# Patient Record
Sex: FEMALE | Race: BLACK OR AFRICAN AMERICAN | NOT HISPANIC OR LATINO | URBAN - METROPOLITAN AREA
[De-identification: names, ages, dates, MRNs, and addresses within clinical notes are randomized per-mention and may not be internally consistent; named-entity substitution may affect disease eponyms.]

---

## 2023-08-01 LAB
CLUE CELLS: NORMAL
NUGENT SCORE: 0
URINE CULTURE: NO GROWTH
YEAST: NORMAL

## 2023-08-02 LAB
CHLAMYDIA TRACH., AMPLIFIED: NEGATIVE
N. GONORRHEA, AMPLIFIED: NEGATIVE
TRICHOMONAS VAGINALIS: NEGATIVE

## 2023-08-09 ENCOUNTER — APPOINTMENT (OUTPATIENT)
Dept: LAB | Facility: LAB | Age: 28
End: 2023-08-09
Payer: MEDICAID

## 2023-08-09 LAB
ABO GROUP (TYPE) IN BLOOD: NORMAL
ANTIBODY SCREEN: NORMAL
ERYTHROCYTE DISTRIBUTION WIDTH (RATIO) BY AUTOMATED COUNT: 16.3 % (ref 11.5–14.5)
ERYTHROCYTE MEAN CORPUSCULAR HEMOGLOBIN CONCENTRATION (G/DL) BY AUTOMATED: 33.2 G/DL (ref 32–36)
ERYTHROCYTE MEAN CORPUSCULAR VOLUME (FL) BY AUTOMATED COUNT: 84 FL (ref 80–100)
ERYTHROCYTES (10*6/UL) IN BLOOD BY AUTOMATED COUNT: 4.12 X10E12/L (ref 4–5.2)
HEMATOCRIT (%) IN BLOOD BY AUTOMATED COUNT: 34.6 % (ref 36–46)
HEMOGLOBIN (G/DL) IN BLOOD: 11.5 G/DL (ref 12–16)
HEPATITIS B VIRUS SURFACE AG PRESENCE IN SERUM: NONREACTIVE
HEPATITIS C VIRUS AB PRESENCE IN SERUM: NONREACTIVE
HIV 1/ 2 AG/AB SCREEN: NONREACTIVE
LEUKOCYTES (10*3/UL) IN BLOOD BY AUTOMATED COUNT: 6.8 X10E9/L (ref 4.4–11.3)
NRBC (PER 100 WBCS) BY AUTOMATED COUNT: 0 /100 WBC (ref 0–0)
PLATELETS (10*3/UL) IN BLOOD AUTOMATED COUNT: 231 X10E9/L (ref 150–450)
REFLEX ADDED, ANEMIA PANEL: ABNORMAL
RH FACTOR: NORMAL
RUBELLA VIRUS IGG AB: POSITIVE
SYPHILIS TOTAL AB: NONREACTIVE

## 2023-08-15 LAB
HEMOGLOBIN A2: 3.3 %
HEMOGLOBIN A: 56.5 %
HEMOGLOBIN F: 2.1 %
HEMOGLOBIN IDENTIFICATION INTERPRETATION: NORMAL
HEMOGLOBIN S: 38.1 %
PATH REVIEW-HGB IDENTIFICATION: NORMAL

## 2023-09-07 LAB
MISCELLANEUOUS TEST RESULT: NORMAL
NAME OF SENDOUT TEST: NORMAL

## 2023-09-25 VITALS — HEIGHT: 61 IN

## 2023-09-28 LAB
CLUE CELLS: NORMAL
NUGENT SCORE: 0
YEAST: NORMAL

## 2023-10-11 ENCOUNTER — APPOINTMENT (OUTPATIENT)
Dept: RADIOLOGY | Facility: CLINIC | Age: 28
End: 2023-10-11
Payer: MEDICAID

## 2023-10-11 ENCOUNTER — ANCILLARY PROCEDURE (OUTPATIENT)
Dept: RADIOLOGY | Facility: CLINIC | Age: 28
End: 2023-10-11
Payer: MEDICAID

## 2023-10-11 DIAGNOSIS — Z36.89 SCREENING, ANTENATAL, FOR FETAL ANATOMIC SURVEY (HHS-HCC): ICD-10-CM

## 2023-10-11 PROCEDURE — 76805 OB US >/= 14 WKS SNGL FETUS: CPT

## 2023-10-11 PROCEDURE — 76817 TRANSVAGINAL US OBSTETRIC: CPT

## 2023-10-11 PROCEDURE — 76811 OB US DETAILED SNGL FETUS: CPT | Performed by: STUDENT IN AN ORGANIZED HEALTH CARE EDUCATION/TRAINING PROGRAM

## 2023-10-11 PROCEDURE — 76817 TRANSVAGINAL US OBSTETRIC: CPT | Performed by: STUDENT IN AN ORGANIZED HEALTH CARE EDUCATION/TRAINING PROGRAM

## 2023-10-23 ENCOUNTER — LAB (OUTPATIENT)
Dept: LAB | Facility: LAB | Age: 28
End: 2023-10-23
Payer: MEDICAID

## 2023-10-23 ENCOUNTER — ROUTINE PRENATAL (OUTPATIENT)
Dept: OBSTETRICS AND GYNECOLOGY | Facility: CLINIC | Age: 28
End: 2023-10-23
Payer: MEDICAID

## 2023-10-23 VITALS — SYSTOLIC BLOOD PRESSURE: 118 MMHG | BODY MASS INDEX: 21.35 KG/M2 | DIASTOLIC BLOOD PRESSURE: 62 MMHG | WEIGHT: 113 LBS

## 2023-10-23 DIAGNOSIS — Z33.1 NORMAL PREGNANCY, INCIDENTAL (HHS-HCC): ICD-10-CM

## 2023-10-23 DIAGNOSIS — Z34.82 PRENATAL CARE, SUBSEQUENT PREGNANCY IN SECOND TRIMESTER (HHS-HCC): ICD-10-CM

## 2023-10-23 DIAGNOSIS — O99.019 SICKLE CELL TRAIT IN MOTHER AFFECTING PREGNANCY (MULTI): Primary | ICD-10-CM

## 2023-10-23 DIAGNOSIS — Z3A.20 PREGNANCY WITH 20 COMPLETED WEEKS GESTATION (HHS-HCC): ICD-10-CM

## 2023-10-23 DIAGNOSIS — D57.3 SICKLE CELL TRAIT IN MOTHER AFFECTING PREGNANCY (MULTI): Primary | ICD-10-CM

## 2023-10-23 PROCEDURE — 36415 COLL VENOUS BLD VENIPUNCTURE: CPT

## 2023-10-23 PROCEDURE — 87086 URINE CULTURE/COLONY COUNT: CPT

## 2023-10-23 PROCEDURE — 99214 OFFICE O/P EST MOD 30 MIN: CPT | Performed by: ADVANCED PRACTICE MIDWIFE

## 2023-10-23 RX ORDER — ASPIRIN 81 MG/1
162 TABLET ORAL DAILY
COMMUNITY
Start: 2023-07-31 | End: 2024-02-26 | Stop reason: HOSPADM

## 2023-10-23 NOTE — PROGRESS NOTES
Assessment/Plan   Diagnoses and all orders for this visit:  Sickle cell trait in mother affecting pregnancy (CMS/HCC)  -     Urine culture  Prenatal care, subsequent pregnancy in second trimester  Normal pregnancy, incidental  -     Glucose, 1 Hour Screen, Pregnancy; Future  -     CBC Anemia Panel With Reflex,Pregnancy; Future  -     Syphilis Screen with Reflex; Future  -     [facility] Prequel Prenatal Screen; Future  Pregnancy with 20 completed weeks gestation    Labs reviewed.   Has follow up anatomy US in 2 weeks - it's a  boy!  Discussed diabetes screening and routine labs, to be completed next visit  Declines flu shot  Pre-test genetic counseling discussed and included:   Interpretation of family and medical histories to assess the probability of disease occurrence or recurrence;   Education about inheritance, genetic testing, disease management, prevention and resources  Counseling to promote informed choices and adaptation to the risk or presented of a genetic condition  Counseling for psychological aspects of genetic testing.  NIPT ordered, kit given. Declines carrier screening  Reviewed s/sx of PTL, warning signs, fetal movement counts, and when to call provider  Follow up in 4 weeks for a routine prenatal visit.    EBONY Patel, OLYA Link is a 28 y.o.  at 20w6d with a working estimated date of delivery of 3/5/2024, by Last Menstrual Period who presents for a routine prenatal visit. She denies vaginal bleeding, leakage of fluid, decreased fetal movements, or contractions.    Patient has questions regarding genetic screening options.     Her pregnancy is complicated by:  Pregnancy Problems (from 23 to present)       Problem Noted Resolved    Prenatal care, subsequent pregnancy in second trimester 10/23/2023 by EBONY Patel, APRN-CNP No    Priority:  Medium      Sickle cell trait in mother affecting pregnancy (CMS/HCC) 10/23/2023 by Estella MEDINA  EBONY Oliva, APRN-CNP No    Priority:  Medium      Pregnancy with 20 completed weeks gestation 10/23/2023 by EBONY Patel, APRN-CNP No    Priority:  Medium               Objective   Physical Exam  Weight: 51.3 kg (113 lb)   Pregravid BMI: 18.90  BP: 118/62  Fetal Heart Rate: 150 Fundal Height (cm):  (@ umbilicus)    Postpartum Depression: Not on file

## 2023-10-24 LAB — BACTERIA UR CULT: NORMAL

## 2023-10-25 ENCOUNTER — ANCILLARY PROCEDURE (OUTPATIENT)
Dept: RADIOLOGY | Facility: CLINIC | Age: 28
End: 2023-10-25
Payer: MEDICAID

## 2023-10-25 DIAGNOSIS — Z36.89 SCREENING, ANTENATAL, FOR FETAL ANATOMIC SURVEY (HHS-HCC): ICD-10-CM

## 2023-10-25 PROCEDURE — 76816 OB US FOLLOW-UP PER FETUS: CPT | Performed by: STUDENT IN AN ORGANIZED HEALTH CARE EDUCATION/TRAINING PROGRAM

## 2023-10-25 PROCEDURE — 76816 OB US FOLLOW-UP PER FETUS: CPT

## 2023-11-07 ENCOUNTER — TELEPHONE (OUTPATIENT)
Dept: OBSTETRICS AND GYNECOLOGY | Facility: CLINIC | Age: 28
End: 2023-11-07

## 2023-11-07 ENCOUNTER — LAB (OUTPATIENT)
Dept: LAB | Facility: LAB | Age: 28
End: 2023-11-07
Payer: MEDICAID

## 2023-11-07 DIAGNOSIS — Z33.1 PREGNANT STATE, INCIDENTAL (HHS-HCC): ICD-10-CM

## 2023-11-07 PROCEDURE — 36415 COLL VENOUS BLD VENIPUNCTURE: CPT

## 2023-11-20 ENCOUNTER — ROUTINE PRENATAL (OUTPATIENT)
Dept: OBSTETRICS AND GYNECOLOGY | Facility: CLINIC | Age: 28
End: 2023-11-20
Payer: MEDICAID

## 2023-11-20 ENCOUNTER — LAB (OUTPATIENT)
Dept: LAB | Facility: LAB | Age: 28
End: 2023-11-20
Payer: MEDICAID

## 2023-11-20 VITALS — BODY MASS INDEX: 21.54 KG/M2 | WEIGHT: 114 LBS | DIASTOLIC BLOOD PRESSURE: 60 MMHG | SYSTOLIC BLOOD PRESSURE: 110 MMHG

## 2023-11-20 DIAGNOSIS — Z34.82 PRENATAL CARE, SUBSEQUENT PREGNANCY IN SECOND TRIMESTER (HHS-HCC): Primary | ICD-10-CM

## 2023-11-20 DIAGNOSIS — Z33.1 NORMAL PREGNANCY, INCIDENTAL (HHS-HCC): ICD-10-CM

## 2023-11-20 DIAGNOSIS — O99.019 SICKLE CELL TRAIT IN MOTHER AFFECTING PREGNANCY (MULTI): ICD-10-CM

## 2023-11-20 DIAGNOSIS — O34.12 LEIOMYOMA OF UTERUS AFFECTING PREGNANCY IN SECOND TRIMESTER (HHS-HCC): ICD-10-CM

## 2023-11-20 DIAGNOSIS — D25.9 LEIOMYOMA OF UTERUS AFFECTING PREGNANCY IN SECOND TRIMESTER (HHS-HCC): ICD-10-CM

## 2023-11-20 DIAGNOSIS — D57.3 SICKLE CELL TRAIT IN MOTHER AFFECTING PREGNANCY (MULTI): ICD-10-CM

## 2023-11-20 LAB
ERYTHROCYTE [DISTWIDTH] IN BLOOD BY AUTOMATED COUNT: 12.8 % (ref 11.5–14.5)
GLUCOSE 1H P 50 G GLC PO SERPL-MCNC: 111 MG/DL
HCT VFR BLD AUTO: 33.2 % (ref 36–46)
HGB BLD-MCNC: 11 G/DL (ref 12–16)
MCH RBC QN AUTO: 29.6 PG (ref 26–34)
MCHC RBC AUTO-ENTMCNC: 33.1 G/DL (ref 32–36)
MCV RBC AUTO: 89 FL (ref 80–100)
NRBC BLD-RTO: 0 /100 WBCS (ref 0–0)
PLATELET # BLD AUTO: 265 X10*3/UL (ref 150–450)
RBC # BLD AUTO: 3.72 X10*6/UL (ref 4–5.2)
REFLEX ADDED, ANEMIA PANEL: NORMAL
WBC # BLD AUTO: 8.1 X10*3/UL (ref 4.4–11.3)

## 2023-11-20 PROCEDURE — 82947 ASSAY GLUCOSE BLOOD QUANT: CPT

## 2023-11-20 PROCEDURE — 86780 TREPONEMA PALLIDUM: CPT

## 2023-11-20 PROCEDURE — 99213 OFFICE O/P EST LOW 20 MIN: CPT | Performed by: OBSTETRICS & GYNECOLOGY

## 2023-11-20 PROCEDURE — 85027 COMPLETE CBC AUTOMATED: CPT

## 2023-11-20 PROCEDURE — 36415 COLL VENOUS BLD VENIPUNCTURE: CPT

## 2023-11-20 NOTE — PROGRESS NOTES
OB Follow up visit    ASSESSMENT & PLAN    Emilia Link is a 28 y.o.  at 24w6d presenting for routine prenatal care    Problem List Items Addressed This Visit       Prenatal care, subsequent pregnancy in second trimester - Primary    Overview     [x] Dating: LMP consistent with 9 week ultrasound  [x] Initial BMI: 19  [x] Prenatal Labs: O+, rubella immune, Hgb 11.5  [x] Pap: NILM/neg HPV 2023  [x] Aneuploidy Screening:  cfDNA result inconclusive, had genetic counseling with [facility], decided to do repeat cfDNA and results pending  [x] Baby ASA: No  [x] Anatomy US: Normal fetal anatomy, large fibroids   [] 1hr GCT at 24-28wks:  [] Tdap (27-36wks):  [x] Flu Shot: Discussed, declines  [x] COVID vaccine: Discussed, declines  [] GBS at 36 wks:  [] Breastfeeding  [] PPBC:   [] 39 weeks discussion of IOL vs. Expectant management:  [] Mode of delivery:            Sickle cell trait in mother affecting pregnancy (CMS/HCC)    Overview     Partner has not been tested. She will discuss with partner.         Leiomyoma of uterus affecting pregnancy in second trimester    Overview     Large uterine fibroids, largest [id number]  Plan for serial growth this pregnancy           - Second trimester labs today  - Desires to switch to MD care     RTC in 4 weeks      SUBJECTIVE    HPI: Emilia Link is a 28 y.o.  at 24w6d here for RPNV. Denies contractions, bleeding, or LOF. Reports normal fetal movement. Patient has no concerns today      OBJECTIVE    Visit Vitals  /60   Wt 51.7 kg (114 lb)   LMP 2023   BMI 21.54 kg/m²   OB Status Pregnant   Smoking Status Never   BSA 1.49 m²        Tati Morrison MD

## 2023-11-21 LAB — T PALLIDUM AB SER QL: NONREACTIVE

## 2023-11-22 DIAGNOSIS — O28.5 ABNORMAL CHROMOSOMAL AND GENETIC FINDING ON ANTENATAL SCREENING MOTHER: Primary | ICD-10-CM

## 2023-11-29 ENCOUNTER — CLINICAL SUPPORT (OUTPATIENT)
Dept: GENETICS | Facility: CLINIC | Age: 28
End: 2023-11-29
Payer: MEDICAID

## 2023-11-29 VITALS
HEIGHT: 61 IN | SYSTOLIC BLOOD PRESSURE: 107 MMHG | WEIGHT: 109 LBS | HEART RATE: 77 BPM | BODY MASS INDEX: 20.58 KG/M2 | DIASTOLIC BLOOD PRESSURE: 73 MMHG

## 2023-11-29 DIAGNOSIS — O28.5 ABNORMAL CHROMOSOMAL AND GENETIC FINDING ON ANTENATAL SCREENING MOTHER: ICD-10-CM

## 2023-11-29 DIAGNOSIS — Z13.79 ENCOUNTER FOR GENETIC SCREENING: Primary | ICD-10-CM

## 2023-11-29 DIAGNOSIS — F81.9 LEARNING DISABILITY: ICD-10-CM

## 2023-11-29 PROCEDURE — GENMD PR GENETICS VISIT (MEDICAID/MEDICARE): Performed by: GENETIC COUNSELOR, MS

## 2023-11-29 PROCEDURE — 81229 CYTOG ALYS CHRML ABNR SNPCGH: CPT | Performed by: GENETIC COUNSELOR, MS

## 2023-11-29 PROCEDURE — 36415 COLL VENOUS BLD VENIPUNCTURE: CPT | Performed by: GENETIC COUNSELOR, MS

## 2023-11-29 PROCEDURE — 81243 FMR1 GEN ALY DETC ABNL ALLEL: CPT | Performed by: GENETIC COUNSELOR, MS

## 2023-11-29 ASSESSMENT — PAIN SCALES - GENERAL: PAINLEVEL: 0-NO PAIN

## 2023-12-04 LAB
ELECTRONICALLY SIGNED BY: NORMAL
FRAGILE X INTERPRETATION: NORMAL
FRAGILE X RESULT: NORMAL

## 2023-12-08 ENCOUNTER — TELEPHONE (OUTPATIENT)
Dept: OBSTETRICS AND GYNECOLOGY | Facility: CLINIC | Age: 28
End: 2023-12-08
Payer: MEDICAID

## 2023-12-14 ENCOUNTER — TELEPHONE (OUTPATIENT)
Dept: OBSTETRICS AND GYNECOLOGY | Facility: CLINIC | Age: 28
End: 2023-12-14
Payer: MEDICAID

## 2023-12-14 ENCOUNTER — HOSPITAL ENCOUNTER (OUTPATIENT)
Facility: HOSPITAL | Age: 28
Discharge: ANOTHER HOSPITAL | End: 2023-12-14
Attending: OBSTETRICS & GYNECOLOGY | Admitting: OBSTETRICS & GYNECOLOGY
Payer: MEDICAID

## 2023-12-14 ENCOUNTER — HOSPITAL ENCOUNTER (INPATIENT)
Facility: HOSPITAL | Age: 28
LOS: 1 days | Discharge: HOME | End: 2023-12-15
Attending: OBSTETRICS & GYNECOLOGY | Admitting: OBSTETRICS & GYNECOLOGY
Payer: MEDICAID

## 2023-12-14 VITALS
WEIGHT: 111 LBS | RESPIRATION RATE: 20 BRPM | HEIGHT: 61 IN | HEART RATE: 80 BPM | SYSTOLIC BLOOD PRESSURE: 110 MMHG | TEMPERATURE: 97 F | BODY MASS INDEX: 20.96 KG/M2 | OXYGEN SATURATION: 99 % | DIASTOLIC BLOOD PRESSURE: 70 MMHG

## 2023-12-14 PROBLEM — O47.00 THREATENED PRETERM LABOR, ANTEPARTUM (HHS-HCC): Status: ACTIVE | Noted: 2023-12-14

## 2023-12-14 LAB
ABO GROUP (TYPE) IN BLOOD: NORMAL
AMPHETAMINES UR QL SCN: ABNORMAL
ANTIBODY SCREEN: NORMAL
APPEARANCE UR: CLEAR
APTT PPP: 32 SECONDS (ref 27–38)
BARBITURATES UR QL SCN: ABNORMAL
BASOPHILS # BLD AUTO: 0.01 X10*3/UL (ref 0–0.1)
BASOPHILS NFR BLD AUTO: 0.1 %
BENZODIAZ UR QL SCN: ABNORMAL
BILIRUB UR STRIP.AUTO-MCNC: NEGATIVE MG/DL
BZE UR QL SCN: ABNORMAL
CANNABINOIDS UR QL SCN: ABNORMAL
COLOR UR: ABNORMAL
ELECTRONICALLY SIGNED BY CYTOGENETICS: NORMAL
EOSINOPHIL # BLD AUTO: 0.04 X10*3/UL (ref 0–0.7)
EOSINOPHIL NFR BLD AUTO: 0.5 %
ERYTHROCYTE [DISTWIDTH] IN BLOOD BY AUTOMATED COUNT: 13.1 % (ref 11.5–14.5)
FENTANYL+NORFENTANYL UR QL SCN: ABNORMAL
FIBRINOGEN PPP-MCNC: 623 MG/DL (ref 200–400)
GLUCOSE UR STRIP.AUTO-MCNC: NEGATIVE MG/DL
HCT VFR BLD AUTO: 34.2 % (ref 36–46)
HGB BLD-MCNC: 11.7 G/DL (ref 12–16)
IMM GRANULOCYTES # BLD AUTO: 0.04 X10*3/UL (ref 0–0.7)
IMM GRANULOCYTES NFR BLD AUTO: 0.5 % (ref 0–0.9)
INR PPP: 1 (ref 0.9–1.1)
KETONES UR STRIP.AUTO-MCNC: NEGATIVE MG/DL
LEUKOCYTE ESTERASE UR QL STRIP.AUTO: NEGATIVE
LYMPHOCYTES # BLD AUTO: 0.85 X10*3/UL (ref 1.2–4.8)
LYMPHOCYTES NFR BLD AUTO: 9.8 %
MCH RBC QN AUTO: 29.4 PG (ref 26–34)
MCHC RBC AUTO-ENTMCNC: 34.2 G/DL (ref 32–36)
MCV RBC AUTO: 86 FL (ref 80–100)
MICROARRAY PLATFORM: NORMAL
MONOCYTES # BLD AUTO: 0.37 X10*3/UL (ref 0.1–1)
MONOCYTES NFR BLD AUTO: 4.3 %
NEUTROPHILS # BLD AUTO: 7.34 X10*3/UL (ref 1.2–7.7)
NEUTROPHILS NFR BLD AUTO: 84.8 %
NITRITE UR QL STRIP.AUTO: NEGATIVE
NRBC BLD-RTO: 0 /100 WBCS (ref 0–0)
OPIATES UR QL SCN: ABNORMAL
OXYCODONE+OXYMORPHONE UR QL SCN: ABNORMAL
PCP UR QL SCN: ABNORMAL
PH UR STRIP.AUTO: 6 [PH]
PLATELET # BLD AUTO: 275 X10*3/UL (ref 150–450)
PROT UR STRIP.AUTO-MCNC: NEGATIVE MG/DL
PROTHROMBIN TIME: 10.7 SECONDS (ref 9.8–12.8)
RBC # BLD AUTO: 3.98 X10*6/UL (ref 4–5.2)
RBC # UR STRIP.AUTO: NEGATIVE /UL
RH FACTOR (ANTIGEN D): NORMAL
SP GR UR STRIP.AUTO: 1
UROBILINOGEN UR STRIP.AUTO-MCNC: <2 MG/DL
WBC # BLD AUTO: 8.7 X10*3/UL (ref 4.4–11.3)

## 2023-12-14 PROCEDURE — 80365 DRUG SCREENING OXYCODONE: CPT | Mod: AHULAB | Performed by: OBSTETRICS & GYNECOLOGY

## 2023-12-14 PROCEDURE — 99223 1ST HOSP IP/OBS HIGH 75: CPT

## 2023-12-14 PROCEDURE — 85610 PROTHROMBIN TIME: CPT | Performed by: OBSTETRICS & GYNECOLOGY

## 2023-12-14 PROCEDURE — 94762 N-INVAS EAR/PLS OXIMTRY CONT: CPT

## 2023-12-14 PROCEDURE — 96372 THER/PROPH/DIAG INJ SC/IM: CPT

## 2023-12-14 PROCEDURE — 2500000004 HC RX 250 GENERAL PHARMACY W/ HCPCS (ALT 636 FOR OP/ED): Performed by: OBSTETRICS & GYNECOLOGY

## 2023-12-14 PROCEDURE — 87086 URINE CULTURE/COLONY COUNT: CPT | Mod: AHULAB | Performed by: OBSTETRICS & GYNECOLOGY

## 2023-12-14 PROCEDURE — 99215 OFFICE O/P EST HI 40 MIN: CPT | Mod: 25

## 2023-12-14 PROCEDURE — 86900 BLOOD TYPING SEROLOGIC ABO: CPT | Performed by: OBSTETRICS & GYNECOLOGY

## 2023-12-14 PROCEDURE — 85384 FIBRINOGEN ACTIVITY: CPT | Performed by: OBSTETRICS & GYNECOLOGY

## 2023-12-14 PROCEDURE — 96372 THER/PROPH/DIAG INJ SC/IM: CPT | Performed by: OBSTETRICS & GYNECOLOGY

## 2023-12-14 PROCEDURE — 99199 UNLISTED SPECIAL SVC PX/RPRT: CPT

## 2023-12-14 PROCEDURE — 36415 COLL VENOUS BLD VENIPUNCTURE: CPT

## 2023-12-14 PROCEDURE — 36415 COLL VENOUS BLD VENIPUNCTURE: CPT | Performed by: OBSTETRICS & GYNECOLOGY

## 2023-12-14 PROCEDURE — 80354 DRUG SCREENING FENTANYL: CPT | Mod: AHULAB | Performed by: OBSTETRICS & GYNECOLOGY

## 2023-12-14 PROCEDURE — 2500000004 HC RX 250 GENERAL PHARMACY W/ HCPCS (ALT 636 FOR OP/ED)

## 2023-12-14 PROCEDURE — 81003 URINALYSIS AUTO W/O SCOPE: CPT | Performed by: OBSTETRICS & GYNECOLOGY

## 2023-12-14 PROCEDURE — 87081 CULTURE SCREEN ONLY: CPT | Mod: AHULAB | Performed by: OBSTETRICS & GYNECOLOGY

## 2023-12-14 PROCEDURE — 85025 COMPLETE CBC W/AUTO DIFF WBC: CPT | Performed by: OBSTETRICS & GYNECOLOGY

## 2023-12-14 PROCEDURE — 80307 DRUG TEST PRSMV CHEM ANLYZR: CPT | Performed by: OBSTETRICS & GYNECOLOGY

## 2023-12-14 PROCEDURE — 99199 UNLISTED SPECIAL SVC PX/RPRT: CPT | Performed by: OBSTETRICS & GYNECOLOGY

## 2023-12-14 PROCEDURE — 1120000001 HC OB PRIVATE ROOM DAILY

## 2023-12-14 RX ORDER — TERBUTALINE SULFATE 1 MG/ML
0.25 INJECTION SUBCUTANEOUS ONCE AS NEEDED
Status: DISCONTINUED | OUTPATIENT
Start: 2023-12-14 | End: 2023-12-15

## 2023-12-14 RX ORDER — LOPERAMIDE HYDROCHLORIDE 2 MG/1
4 CAPSULE ORAL EVERY 2 HOUR PRN
Status: DISCONTINUED | OUTPATIENT
Start: 2023-12-14 | End: 2023-12-15

## 2023-12-14 RX ORDER — CARBOPROST TROMETHAMINE 250 UG/ML
250 INJECTION, SOLUTION INTRAMUSCULAR ONCE AS NEEDED
Status: DISCONTINUED | OUTPATIENT
Start: 2023-12-14 | End: 2023-12-15

## 2023-12-14 RX ORDER — SODIUM CHLORIDE, SODIUM LACTATE, POTASSIUM CHLORIDE, CALCIUM CHLORIDE 600; 310; 30; 20 MG/100ML; MG/100ML; MG/100ML; MG/100ML
125 INJECTION, SOLUTION INTRAVENOUS CONTINUOUS
Status: DISCONTINUED | OUTPATIENT
Start: 2023-12-14 | End: 2023-12-14 | Stop reason: HOSPADM

## 2023-12-14 RX ORDER — HYDRALAZINE HYDROCHLORIDE 20 MG/ML
5 INJECTION INTRAMUSCULAR; INTRAVENOUS ONCE AS NEEDED
Status: DISCONTINUED | OUTPATIENT
Start: 2023-12-14 | End: 2023-12-15

## 2023-12-14 RX ORDER — PENICILLIN G 3000000 [IU]/50ML
3 INJECTION, SOLUTION INTRAVENOUS EVERY 4 HOURS
Status: DISCONTINUED | OUTPATIENT
Start: 2023-12-15 | End: 2023-12-15

## 2023-12-14 RX ORDER — METHYLERGONOVINE MALEATE 0.2 MG/ML
0.2 INJECTION INTRAVENOUS ONCE AS NEEDED
Status: DISCONTINUED | OUTPATIENT
Start: 2023-12-14 | End: 2023-12-15

## 2023-12-14 RX ORDER — TRANEXAMIC ACID 100 MG/ML
1000 INJECTION, SOLUTION INTRAVENOUS ONCE AS NEEDED
Status: DISCONTINUED | OUTPATIENT
Start: 2023-12-14 | End: 2023-12-15

## 2023-12-14 RX ORDER — METOCLOPRAMIDE 10 MG/1
10 TABLET ORAL EVERY 6 HOURS PRN
Status: DISCONTINUED | OUTPATIENT
Start: 2023-12-14 | End: 2023-12-15

## 2023-12-14 RX ORDER — NIFEDIPINE 10 MG/1
10 CAPSULE ORAL ONCE AS NEEDED
Status: DISCONTINUED | OUTPATIENT
Start: 2023-12-14 | End: 2023-12-15

## 2023-12-14 RX ORDER — LIDOCAINE HYDROCHLORIDE 10 MG/ML
30 INJECTION INFILTRATION; PERINEURAL ONCE AS NEEDED
Status: DISCONTINUED | OUTPATIENT
Start: 2023-12-14 | End: 2023-12-15

## 2023-12-14 RX ORDER — SODIUM CHLORIDE, SODIUM LACTATE, POTASSIUM CHLORIDE, CALCIUM CHLORIDE 600; 310; 30; 20 MG/100ML; MG/100ML; MG/100ML; MG/100ML
75 INJECTION, SOLUTION INTRAVENOUS CONTINUOUS
Status: DISCONTINUED | OUTPATIENT
Start: 2023-12-14 | End: 2023-12-15

## 2023-12-14 RX ORDER — CALCIUM GLUCONATE 98 MG/ML
1 INJECTION, SOLUTION INTRAVENOUS ONCE AS NEEDED
Status: DISCONTINUED | OUTPATIENT
Start: 2023-12-14 | End: 2023-12-14 | Stop reason: HOSPADM

## 2023-12-14 RX ORDER — ONDANSETRON HYDROCHLORIDE 2 MG/ML
4 INJECTION, SOLUTION INTRAVENOUS EVERY 6 HOURS PRN
Status: DISCONTINUED | OUTPATIENT
Start: 2023-12-14 | End: 2023-12-15

## 2023-12-14 RX ORDER — MISOPROSTOL 200 UG/1
800 TABLET ORAL ONCE AS NEEDED
Status: DISCONTINUED | OUTPATIENT
Start: 2023-12-14 | End: 2023-12-15

## 2023-12-14 RX ORDER — NIFEDIPINE 10 MG/1
10 CAPSULE ORAL ONCE AS NEEDED
Status: DISCONTINUED | OUTPATIENT
Start: 2023-12-14 | End: 2023-12-14 | Stop reason: HOSPADM

## 2023-12-14 RX ORDER — OXYTOCIN 10 [USP'U]/ML
10 INJECTION, SOLUTION INTRAMUSCULAR; INTRAVENOUS ONCE AS NEEDED
Status: DISCONTINUED | OUTPATIENT
Start: 2023-12-14 | End: 2023-12-15

## 2023-12-14 RX ORDER — LABETALOL HYDROCHLORIDE 5 MG/ML
20 INJECTION, SOLUTION INTRAVENOUS ONCE AS NEEDED
Status: DISCONTINUED | OUTPATIENT
Start: 2023-12-14 | End: 2023-12-15

## 2023-12-14 RX ORDER — METOCLOPRAMIDE HYDROCHLORIDE 5 MG/ML
10 INJECTION INTRAMUSCULAR; INTRAVENOUS EVERY 6 HOURS PRN
Status: DISCONTINUED | OUTPATIENT
Start: 2023-12-14 | End: 2023-12-15

## 2023-12-14 RX ORDER — HYDRALAZINE HYDROCHLORIDE 20 MG/ML
5 INJECTION INTRAMUSCULAR; INTRAVENOUS ONCE AS NEEDED
Status: DISCONTINUED | OUTPATIENT
Start: 2023-12-14 | End: 2023-12-14 | Stop reason: HOSPADM

## 2023-12-14 RX ORDER — ONDANSETRON 4 MG/1
4 TABLET, FILM COATED ORAL EVERY 6 HOURS PRN
Status: DISCONTINUED | OUTPATIENT
Start: 2023-12-14 | End: 2023-12-14 | Stop reason: HOSPADM

## 2023-12-14 RX ORDER — ONDANSETRON 4 MG/1
4 TABLET, FILM COATED ORAL EVERY 6 HOURS PRN
Status: DISCONTINUED | OUTPATIENT
Start: 2023-12-14 | End: 2023-12-15

## 2023-12-14 RX ORDER — LABETALOL HYDROCHLORIDE 5 MG/ML
20 INJECTION, SOLUTION INTRAVENOUS ONCE AS NEEDED
Status: DISCONTINUED | OUTPATIENT
Start: 2023-12-14 | End: 2023-12-14 | Stop reason: HOSPADM

## 2023-12-14 RX ORDER — ONDANSETRON HYDROCHLORIDE 2 MG/ML
4 INJECTION, SOLUTION INTRAVENOUS EVERY 6 HOURS PRN
Status: DISCONTINUED | OUTPATIENT
Start: 2023-12-14 | End: 2023-12-14 | Stop reason: HOSPADM

## 2023-12-14 RX ORDER — MAGNESIUM SULFATE HEPTAHYDRATE 40 MG/ML
2 INJECTION, SOLUTION INTRAVENOUS CONTINUOUS
Status: DISCONTINUED | OUTPATIENT
Start: 2023-12-14 | End: 2023-12-15

## 2023-12-14 RX ORDER — CALCIUM GLUCONATE 98 MG/ML
1 INJECTION, SOLUTION INTRAVENOUS ONCE AS NEEDED
Status: DISCONTINUED | OUTPATIENT
Start: 2023-12-14 | End: 2023-12-15

## 2023-12-14 RX ORDER — MAGNESIUM SULFATE HEPTAHYDRATE 40 MG/ML
2 INJECTION, SOLUTION INTRAVENOUS CONTINUOUS
Status: DISCONTINUED | OUTPATIENT
Start: 2023-12-14 | End: 2023-12-14 | Stop reason: HOSPADM

## 2023-12-14 RX ORDER — LIDOCAINE HYDROCHLORIDE 10 MG/ML
0.5 INJECTION INFILTRATION; PERINEURAL ONCE AS NEEDED
Status: DISCONTINUED | OUTPATIENT
Start: 2023-12-14 | End: 2023-12-14 | Stop reason: HOSPADM

## 2023-12-14 RX ORDER — OXYTOCIN/0.9 % SODIUM CHLORIDE 30/500 ML
60 PLASTIC BAG, INJECTION (ML) INTRAVENOUS ONCE AS NEEDED
Status: DISCONTINUED | OUTPATIENT
Start: 2023-12-14 | End: 2023-12-15

## 2023-12-14 RX ORDER — BETAMETHASONE SODIUM PHOSPHATE AND BETAMETHASONE ACETATE 3; 3 MG/ML; MG/ML
12 INJECTION, SUSPENSION INTRA-ARTICULAR; INTRALESIONAL; INTRAMUSCULAR; SOFT TISSUE ONCE
Status: COMPLETED | OUTPATIENT
Start: 2023-12-14 | End: 2023-12-14

## 2023-12-14 RX ADMIN — SODIUM CHLORIDE, POTASSIUM CHLORIDE, SODIUM LACTATE AND CALCIUM CHLORIDE 1000 ML: 600; 310; 30; 20 INJECTION, SOLUTION INTRAVENOUS at 16:39

## 2023-12-14 RX ADMIN — PENICILLIN G POTASSIUM 5 MILLION UNITS: 5000000 INJECTION, POWDER, FOR SOLUTION INTRAMUSCULAR; INTRAVENOUS at 20:55

## 2023-12-14 RX ADMIN — SODIUM CHLORIDE, POTASSIUM CHLORIDE, SODIUM LACTATE AND CALCIUM CHLORIDE 75 ML/HR: 600; 310; 30; 20 INJECTION, SOLUTION INTRAVENOUS at 20:00

## 2023-12-14 RX ADMIN — MAGNESIUM SULFATE HEPTAHYDRATE 2 G/HR: 40 INJECTION, SOLUTION INTRAVENOUS at 20:00

## 2023-12-14 RX ADMIN — MAGNESIUM SULFATE HEPTAHYDRATE 2 G/HR: 40 INJECTION, SOLUTION INTRAVENOUS at 16:46

## 2023-12-14 RX ADMIN — BETAMETHASONE SODIUM PHOSPHATE AND BETAMETHASONE ACETATE 12 MG: 3; 3 INJECTION, SUSPENSION INTRA-ARTICULAR; INTRALESIONAL; INTRAMUSCULAR at 16:15

## 2023-12-14 SDOH — HEALTH STABILITY: MENTAL HEALTH: WERE YOU ABLE TO COMPLETE ALL THE BEHAVIORAL HEALTH SCREENINGS?: YES

## 2023-12-14 SDOH — SOCIAL STABILITY: SOCIAL INSECURITY: PHYSICAL ABUSE: DENIES

## 2023-12-14 SDOH — SOCIAL STABILITY: SOCIAL INSECURITY: DO YOU FEEL ANYONE HAS EXPLOITED OR TAKEN ADVANTAGE OF YOU FINANCIALLY OR OF YOUR PERSONAL PROPERTY?: NO

## 2023-12-14 SDOH — HEALTH STABILITY: MENTAL HEALTH: WISH TO BE DEAD (PAST 1 MONTH): NO

## 2023-12-14 SDOH — SOCIAL STABILITY: SOCIAL INSECURITY: DOES ANYONE TRY TO KEEP YOU FROM HAVING/CONTACTING OTHER FRIENDS OR DOING THINGS OUTSIDE YOUR HOME?: NO

## 2023-12-14 SDOH — SOCIAL STABILITY: SOCIAL INSECURITY: VERBAL ABUSE: DENIES

## 2023-12-14 SDOH — HEALTH STABILITY: MENTAL HEALTH: NON-SPECIFIC ACTIVE SUICIDAL THOUGHTS (PAST 1 MONTH): NO

## 2023-12-14 SDOH — SOCIAL STABILITY: SOCIAL INSECURITY: ABUSE SCREEN: ADULT

## 2023-12-14 SDOH — SOCIAL STABILITY: SOCIAL INSECURITY: ARE YOU OR HAVE YOU BEEN THREATENED OR ABUSED PHYSICALLY, EMOTIONALLY, OR SEXUALLY BY ANYONE?: NO

## 2023-12-14 SDOH — SOCIAL STABILITY: SOCIAL INSECURITY: ARE THERE ANY APPARENT SIGNS OF INJURIES/BEHAVIORS THAT COULD BE RELATED TO ABUSE/NEGLECT?: NO

## 2023-12-14 SDOH — HEALTH STABILITY: MENTAL HEALTH: SUICIDAL BEHAVIOR (LIFETIME): NO

## 2023-12-14 SDOH — SOCIAL STABILITY: SOCIAL INSECURITY: HAS ANYONE EVER THREATENED TO HURT YOUR FAMILY OR YOUR PETS?: NO

## 2023-12-14 SDOH — SOCIAL STABILITY: SOCIAL INSECURITY: HAVE YOU HAD THOUGHTS OF HARMING ANYONE ELSE?: NO

## 2023-12-14 SDOH — ECONOMIC STABILITY: HOUSING INSECURITY: DO YOU FEEL UNSAFE GOING BACK TO THE PLACE WHERE YOU ARE LIVING?: NO

## 2023-12-14 ASSESSMENT — PAIN SCALES - GENERAL
PAINLEVEL_OUTOF10: 8
PAINLEVEL_OUTOF10: 0 - NO PAIN

## 2023-12-14 ASSESSMENT — PATIENT HEALTH QUESTIONNAIRE - PHQ9
2. FEELING DOWN, DEPRESSED OR HOPELESS: NOT AT ALL
SUM OF ALL RESPONSES TO PHQ9 QUESTIONS 1 & 2: 0
1. LITTLE INTEREST OR PLEASURE IN DOING THINGS: NOT AT ALL

## 2023-12-14 ASSESSMENT — LIFESTYLE VARIABLES
SKIP TO QUESTIONS 9-10: 1
HOW OFTEN DO YOU HAVE A DRINK CONTAINING ALCOHOL: NEVER
AUDIT-C TOTAL SCORE: 0
HOW OFTEN DO YOU HAVE 6 OR MORE DRINKS ON ONE OCCASION: NEVER
HOW MANY STANDARD DRINKS CONTAINING ALCOHOL DO YOU HAVE ON A TYPICAL DAY: PATIENT DOES NOT DRINK
AUDIT-C TOTAL SCORE: 0

## 2023-12-14 ASSESSMENT — ACTIVITIES OF DAILY LIVING (ADL): LACK_OF_TRANSPORTATION: NO

## 2023-12-14 NOTE — NURSING NOTE
1525  28.2 weeks arrive to triage via wheelchair; placed in room B.  States +FM, denies LOF, VB, or contractions to her knowledge. Patient is complaining of R sided abdominal pain that started about 1200pm. States the pain comes and goes but when does come she rates it an 8/10 and hard for her to move. Thought she was dehydrated so drank 2 bottles of water.       1550 concerned about PTL; Dr Garcia to bedside   1555 SVE   1557 BSUS confirmed vertex  1605 IV inserted and blood work drawn     See eMAR regarding magnesium infusion  2gm infusion scanned first before bolus; bolus started at 1650    1705 bedpan     1819 Transport arrived; report given  183 transport left    1843 Report given to triage nurse at mac 2

## 2023-12-14 NOTE — SIGNIFICANT EVENT
Case reviewed w [facility] 2 attending Dr. Walls. Will accept transfer for possible PTL.  Mag started for neuroprotection, GBS done, labs and urine sent.   Pt consent for transfer obtained.

## 2023-12-14 NOTE — PROGRESS NOTES
Emilia Link is a 28 y.o. old, , female who was approximately 26 weeks and 1 day pregnant at the time of our appointment with an EDC of 3/5/24.  She was seen to discuss her genetic screening and testing options due to two no call results on [employer] Prequel cell-free DNA screening.  This patient was seen by Hannah Messer, Genetic Counseling Intern, in conjunction with Nelsy Hill, MS Licensed Genetic Counselor.      PAST HISTORY:  The patient is currently taking prenatal vitamins and baby aspirin.  She is allergic to Motrin.  She noticed some spotting early in the pregnancy, but this has since stopped.  Patient otherwise denies complications such as bleeding or cramping, exposures, and infection/illness since finding out she was pregnant.The patient has a history of one termination (due to personal reasons).  The patient had Hemoglobin Identification in 2023 and findings are compatible with Sickle Cell trait.  She had cell free DNA ordered through [employer] prequel at 20w6d and again at 23w0d, both of which showed no result.    Ultrasounds:   23 (9w1d): large fibroids measuring 5.8 x 6.0 x 8.3 cm at the fundus and a smaller 3.9 x 4.2 x 4.7cm.  The larger one appears to be distorting the uterine cavity.”  23 (14w2d): NT 1.3mm  10/11/23 (19w1d): incomplete survey with f/u in 2 weeks, fetal sex: male  10/25/23 (21w1d): survey completed, no malformations noted, multiple fibroids noted and stable from the previous exam     FAMILY HISTORY:  Medical and family histories were reviewed and the following concerns regarding this pregnancy were apparent:      Ms. Margarito García-  Personal history- as noted above  Mother- Sickle Cell Anemia, lupus, learning disability, IEP,  from a brain aneurysm at 38 years old.  Maternal great grandmother- colon cancer diagnosed at an unknown age.  Maternal second cousin-  breast cancer diagnosed in her late 40's.  Two additional maternal family members with  breast cancer diagnosed above age 50, but the patient is unsure of the degree of relation.    Patient's reproductive partner:  Personal history- seizures   Son (from a previous relationship)- seizures   Father-  in his late 40's due to heart issues.    The remainder of the family history was negative for birth defects, intellectual disability, recurrent pregnancy loss, or recognized inherited conditions.  Consanguinity was denied.  The Pedigree is scanned in the Media tab and is available for a full review of the family history.      ETHNICITY:  The patient reported that she is of  ethnicity.  She reported that her partner is of  ethnicity.  Ashkenazi Buddhism Ancestry was denied.    We discussed the availability, benefits, and limitations of carrier screening for cystic fibrosis (CF), spinal muscular atrophy (SMA), and hemoglobinopathies/thalassemias. We reviewed the carrier frequencies of these conditions, varied clinical manifestations, and their autosomal recessive inheritance. If both members of the couple are found to be carriers for the same autosomal recessive condition, there is a 25% chance for an affected child and prenatal diagnosis would be available. Negative carrier screening does not rule out the possibility of being a carrier. [place] screening is available for CF, hemoglobinopathies, and thalassemias, and SMA.  We also discussed the availability of more expanded/pan ethnic carrier screening for additional, primarily autosomal recessive, conditions. We discussed the pros and cons of expanded carrier screening including the higher likelihood of being identified as a carrier for at least one condition on a larger panel. Approximately 4% of couples are found to be at risk to have a child with a genetic disorder based on this screening. In rare cases, expanded carrier screening results may have health implications for the tested individual.      After careful  consideration, Ms. Link elected carrier screening for CF, SMA, HBB, and HBA1/2 to [place] with reflex to placental DNA screening.  The patient declined all other carrier screening.    COUNSELING:  The following information was discussed with your patient:    1. The patient had two no call results on standard cell-free DNA screening.      We reviewed the technology, benefits and limitations of standard cell-free DNA screening.  We discussed the methodology for this screen, which includes using cell-free DNA obtained from a mother's blood (derived from the placenta) to screen for the presence of common chromosomal abnormalities. We also discussed that some studies show that there is an increased risk for certain aneuploidies with low fetal fraction or no calls. Other companies have not found this same increased risk for aneuploidy with no call results.  Samples can fail due to a low fetal fraction, often associated with high BMI, as well as due to technical reasons, or certain medications, such as blood thinners like Lovenox.  Additionally, some samples fail due to a vanishing twin with aneuploidy that still has residual placental DNA in the bloodstream.  Maternal factors, such as chromosome abnormalities that are maternal in origin, maternal malignancy or cancer, and uterine fibroids have also been associated with inconclusive results.   The patient was noted to have several uterine fibroids on ultrasound.    We reviewed the conversation with the [facility] genetic counselor regarding Ms. Link's Prequel data.  The [facility] genetic counselor stated that the patient's two Prequel samples failed for the same reason (noisy data that was inconsistent from one run to the next).  The [facility] genetic counselor also stated that there did not appear to be a low fetal fraction.  The [facility] genetic counselor did not note increased concern for maternal cancer.  We informed the patient that we can't determine the cause of  the inconclusive results, but that additional testing is available to determine if the pregnancy is affected with any of the chromosome conditions discussed.  We reviewed the benefits and limitations of repeat cell free DNA testing, potentially with the same or a different company.      2.  The benefits and limitations of ultrasound study. An ultrasound study is recommended at 12-14 weeks gestation for assessment of nuchal translucency and again at 19-20 weeks gestation to survey fetal organs. An ultrasound study in the second trimester can identify 50% of Down syndrome cases and 80-90% of trisomy 18 or trisomy 13 cases.     3. The availability, benefits and limitations of standard cell-free DNA screening.  We discussed the methodology for this screen, which includes using cell-free DNA obtained from a mother's blood (derived from the placenta) to screen for the presence of common chromosomal abnormalities. Depending on the laboratory used, there is a >99% sensitivity for Down syndrome, at least 97.4% sensitivity for trisomy 18, and at least 91% sensitivity for trisomy 13.  Specificity for these trisomies is >99%. Although sensitivity and specificity rates are high, in our experience the positive predictive value is dependent on many factors; whereas false negative results are rare.  In addition, anticoagulants, maternal chromosome abnormality, fibroids or malignancy may impact results and/or be associated with inconclusive or other abnormal findings.  This is not a diagnostic test.  Therefore, in the event of an abnormal result, prenatal diagnosis through amniocentesis is recommended to confirm the findings, if confirmation is desired.  Results take approximately 7-10 days.      4. The methods, benefits, limitations and risks of amniocentesis.  There is an approximate 1 in 400 risk of complications including a 1 in 800 risk of miscarriage.    5. The patient has the option of no further screening or testing, a  redraw for NIPS to the same or a different company, and diagnostic testing. We discussed the benefits and limitations of each option.    6.  The patient is a carrier of sickle cell trait. Approximately 1 in 10 individuals of  ancestry are carriers of sickle cell trait.     The term sickle cell disease includes a group of disorders.  In the [place], sickle cell anemia (Hb SS) accounts for approximately 60%-70% cases of sickle cell disease. The other forms of sickle cell disease result from the inheritance of Hb S with other abnormal globin chain variants such as sickle-hemoglobin C disease (Hb SC), sickle-beta thalassemia, and other abnormal variants (E, D, etc).    Hemoglobin is a molecule in red blood cells that delivers oxygen to cells in the body. Individuals with this disorder have hemoglobin molecules that distort red blood cells into a sickled (C-shape/crescent) shape. These sickled cells break down prematurely, which leads to anemia. Long lasting anemia can lead to organ damage and other complications.   Signs and symptoms of sickle cell disease usually begin in early childhood and include recurrent infections, anemia, and periods of pain. The severity of symptoms varies. Some individuals have mild symptoms, while others are frequently hospitalized for more serious complications.     We reviewed the inheritance of sickle cell disease. This condition is inherited in an autosomal recessive manner, which means that individuals who have sickle cell disease have a mutation (or change) in BOTH of their copies of the HBB gene. An individual who has a mutation in only ONE copy of their gene is called a carrier. When both parents are carriers, in each pregnancy, there is a 25% (1 in 4) chance of having a child with sickle cell disease, a 50% (2 in 4) chance of having a child who is a carrier, and a 25% (1 in 4) chance of having a child who is not affected and not a carrier.    Based on ethnicity alone, the  couple has a 1 in 40 (2.5%) chance for each pregnancy together to be affected with sickle cell disease. If her partner was found to be a carrier there would be a 1 in 4 (25%) chance for each pregnancy to be affected with a hemoglobinopathy.     If both members of a couple are found to be carriers of hemoglobinopathies/thalassemias prenatal diagnosis in pregnancy is available and prenatal testing of embryos may also be available prior to a future pregnancy. We discussed the option of [facility] and genetic amniocentesis or testing the child at birth, and the [place] screen. We discussed that even if the father of the pregnancy did not get hemoglobinopathy screening, diagnostic testing could still be performed, but it is not as informative. There is also cell-free DNA screening clinically available via [email or web address] for certain single gene disorders such as hemoglobinopathies and does not require a paternal sample. Data on clinical performance metrics is extremely limited. The patient would like to speak to her partner but stated that she is not sure that he will come in to get screened for hemoglobinopathies.  The patient elected [email or web address] carrier screening with reflex screening of the placental DNA for select single gene disorders.     The patient's offspring have at least a 50% chance per pregnancy to have sickle cell trait. This risk may be higher for unaffected children if her partner is determined to also be a carrier of a hemoglobinopathy or beta thalassemia. We recommend that the couple share this information with all of their children when of reproductive age but prior to reproducing and family members who may be considering having children in the future, as they may wish to pursue carrier testing themselves based on these results.    7.  The patient has a history of learning delays and an IEP.  We discussed the technology, benefits, and limitations of screening for Fragile X syndrome and a microarray. The patient elected  this testing.  Learning delays may occur as part of a chromosome abnormality or single gene disorder.  When isolated, it may result from a combination of genes and environment, also referred to as multifactorial causes. Without further information it is difficult to predict risk of learning delays for the current pregnancy, but it is increased above that of the general population. A general genetics evaluation could be considered for any individual with developmental delays and an appointment can be made by calling [phone].  If an underlying genetic etiology in the family is determined, precise recurrence risks could be provided, and testing for other family members may be available if clinically indicated.    8. Additional Family History Information:   The patient's partner and his son (from a previous relationship) have a history of seizures. We reviewed that we are learning more about the genetics of seizure disorders, and sometimes seizures run in families. General genetic counseling is available for any family member with seizures and an appointment can be made by calling [phone]. If an underlying genetic etiology in the family is determined, precise recurrence risks could be provided, and testing for other family members may be available if clinically indicated.    The patient reported multiple maternal family members with cancer.  Cancer genetic counseling is recommended for this family.  An appointment with our Cancer Genetics Clinic can be made by calling [phone].  At that time, an assessment of the family history of cancer, cancer genetic testing, personal cancer risk and options for risk reduction would then be discussed.   If these individuals are unable or unwilling to have cancer genetic counseling, other family members may seek cancer genetic counseling based on this family history.    The patient's partner has a family history of heart disease. Often, these conditions are due to a  "combination of genetic and environmental factors (which often remain unknown).  The risk to have them often depends on the amount and degree of affected family members.  It also depends on if an underlying genetic cause of these conditions can be identified.   With this history, the couple and their offspring are at an increased risk for the disorders in their respective families and this information should be shared with all relevant primary care providers.      The patient's mother had a brain aneurysm.  \"In most cases, brain aneurysms are not hereditary, and there is generally only a single case in a family. Occasionally, however, an individual with a brain aneurysm will have other family members who are affected. When two or more first-degree relatives (parent, child, or sibling) have proven aneurysms, these are called \"familial aneurysms... Individuals in these families may be at higher risk of developing aneurysms than the general population. Therefore aneurysm screening with an imaging study of the brain arteries is usually recommended, particularly for first-degree relatives..\" We discussed that the patient should discuss screening for aneurysms with her primary provider. She stated that she would do this. <[email or web address].org/diagnosis-and-screening/[email or web address]-cxlohwvu-[email or web address]      DISPOSITION:  The patient stated that she understood the above information and elected to proceed with standard cell-free DNA screening to [employer].  The patient also elected [place] carrier screening for select single gene disorders (CF, SMA, HBB, HBA1/2) with reflex to placental DNA screening.  All other carrier screening was declined.  The patient also elected Fragile X testing and a microarray. Diagnostic testing in the form of genetic amniocentesis was declined.      The patient stated that she would not terminate a pregnancy for any reason, including any chromosomal abnormality or genetic condition in the fetus.     We " recommend a growth ultrasound at 28-30 weeks.      Thank you for allowing us to participate in the care of your patient.  Should you or your patient have any questions, please do not hesitate to contact our office at [phone].    Licensed Genetic Counselor Nelsy Hill MS, [facility] spent 55 minutes with the patient with greater than 50% of the time spent in face to face counseling.     Sincerely,    Nelsy Hill MS  Licensed Genetic Counselor    Reviewed by:  Dr. Marquise Basurto MD  Maternal Fetal Medicine Specialist

## 2023-12-14 NOTE — TELEPHONE ENCOUNTER
Pt calling back-states the pain has gotten worse.  Pt states she cannot walk.  Advised pt proceed to OB triage, pt states her  is taking her to [facility].

## 2023-12-14 NOTE — H&P
Obstetrical Admission History and Physical     Emilia Link is a 28 y.o. . Onset of abdominal pain R>L at 1200 coming in waves initially 7-8 minutes now every 3-4.     Chief Complaint: Abdominal Pain    Assessment/Plan    IUP at 28.2  Possible PTL  Possible renal lithiasis    PTL protocol  Transfer to Level 4    Active Problems:  There are no active Hospital Problems.      Pregnancy Problems (from 23 to present)       Problem Noted Resolved    Leiomyoma of uterus affecting pregnancy in second trimester 2023 by Tati Morrison MD No    Priority:  Medium      Overview Signed 2023 10:00 AM by Tati Morrison MD     Large uterine fibroids, largest [id number]  Plan for serial growth this pregnancy         Prenatal care, subsequent pregnancy in second trimester 10/23/2023 by EBONY Patel, APRN-CNP No    Priority:  Medium      Overview Addendum 2023 10:38 AM by Tati Morrison MD     [x] Dating: LMP consistent with 9 week ultrasound  [x] Initial BMI: 19  [x] Prenatal Labs: O+, rubella immune, Hgb 11.5  [x] Pap: NILM/neg HPV 2023  [x] Aneuploidy Screening:  cfDNA result inconclusive, had genetic counseling with [employer], decided to do repeat cfDNA and results pending  [x] Baby ASA: No  [x] Anatomy US: Normal fetal anatomy, large fibroids   [] 1hr GCT at 24-28wks:  [] Tdap (27-36wks):  [x] Flu Shot: Discussed, declines  [x] COVID vaccine: Discussed, declines  [] GBS at 36 wks:  [] Breastfeeding  [] PPBC:   [] 39 weeks discussion of IOL vs. Expectant management:  [] Mode of delivery:            Sickle cell trait in mother affecting pregnancy (CMS/HCC) 10/23/2023 by EBONY Patel, APRN-CNP No    Priority:  Medium      Overview Signed 2023 10:07 AM by Tati Morrison MD     Partner has not been tested. She will discuss with partner.         Pregnancy with 20 completed weeks gestation 10/23/2023 by EBONY Patel, APRN-CNP No     Priority:  Medium            Subjective   Emilia is here complaining of abdominal pain, unsure if contractions. Good fetal movement. Denies vaginal bleeding., Denies leaking of fluid.  No trauma to abdomen, no falls or MVAs.     Pregnancy uncomplicated per patient. Denies PSH.     Obstetrical History   OB History    Para Term  AB Living   2       1 0   SAB IAB Ectopic Multiple Live Births           0      # Outcome Date GA Lbr Jacky/2nd Weight Sex Delivery Anes PTL Lv   2 Current            1 AB 2012 4w0d              Past Medical History  Past Medical History:   Diagnosis Date    Other specified health status     No pertinent past medical history    Personal history of other diseases of the female genital tract 2014    History of vaginitis    Personal history of other diseases of the female genital tract 2014    Personal history of amenorrhea    Personal history of other diseases of the female genital tract 2014    History of vaginitis    Sickle cell anemia (CMS/HCC)         Past Surgical History   No past surgical history on file.    Social History  Social History     Tobacco Use    Smoking status: Never     Passive exposure: Never    Smokeless tobacco: Never   Substance Use Topics    Alcohol use: Not Currently     Substance and Sexual Activity   Drug Use Never       Allergies  Motrin [ibuprofen]     Medications  Medications Prior to Admission   Medication Sig Dispense Refill Last Dose    aspirin 81 mg EC tablet Take 2 tablets (162 mg) by mouth once daily.       prenatal no115/iron/folic acid (PRENATAL 19 ORAL) Take by mouth.          Objective    Last Vitals  Temp Pulse Resp BP MAP O2 Sat   36.3 °C (97.3 °F) 80 (Simultaneous filing. User may not have seen previous data.) 20 115/65 (Simultaneous filing. User may not have seen previous data.)   100 % (Simultaneous filing. User may not have seen previous data.)     Physical Examination  GENERAL: Examination reveals a well developed,  well nourished, gravid female  standing at side of bed breathing through pain. . She is alert and cooperative and in obvious distress.  LUNGS:  no stridor, wheezing.  ABDOMEN:  soft, no masses, uterus nontender  FHR is 150 , with  accelerations, and a cat 1  tracing.    [place] reading:    The fetus is in a vertex presentation, determined by ultrasound  CERVIX: 1  cm dilated,  80 % effaced, -3  station; MEMBRANES are intact   EXTREMITIES: no redness or tenderness in the calves or thighs, no edema  NEUROLOGICAL: DTRs normal and symmetrical  PSYCHOLOGICAL: awake and alert; oriented to person, place, and time    Lab Review  Labs in chart were reviewed.

## 2023-12-14 NOTE — TELEPHONE ENCOUNTER
"Spoke with pt-28w2d, c/o intermittent, \"intense,\" R sided sharp, pulling sensation, ABD \"cramp.\"  Pt states it occurred earlier and lasted 5 minutes.   Pt then began to hydrate and applied warm compress with some relief.   Pt denies LOF/contractions/VB.   Pt reports good FM.  Pt does have multiple uterine fibroids.  Reviewed round ligament pain typically described as a sharp cramp down your side.   Advised increased hydration, stretches, and warm compress.   D/w pt if pain persistent/worsens despite interventions she should proceed to OB triage for evaluation.   Pt verbalized understanding and is comfortable with recommendations.   Pt's next OB apt 12/18.  "

## 2023-12-15 ENCOUNTER — APPOINTMENT (OUTPATIENT)
Dept: RADIOLOGY | Facility: HOSPITAL | Age: 28
End: 2023-12-15
Payer: MEDICAID

## 2023-12-15 ENCOUNTER — HOSPITAL ENCOUNTER (INPATIENT)
Facility: HOSPITAL | Age: 28
End: 2023-12-15
Attending: OBSTETRICS & GYNECOLOGY | Admitting: OBSTETRICS & GYNECOLOGY
Payer: MEDICAID

## 2023-12-15 VITALS
DIASTOLIC BLOOD PRESSURE: 70 MMHG | SYSTOLIC BLOOD PRESSURE: 103 MMHG | RESPIRATION RATE: 20 BRPM | OXYGEN SATURATION: 100 % | TEMPERATURE: 97.3 F | HEART RATE: 77 BPM

## 2023-12-15 PROBLEM — O36.5990 FETAL GROWTH RESTRICTION ANTEPARTUM (HHS-HCC): Status: ACTIVE | Noted: 2023-12-15

## 2023-12-15 PROBLEM — R82.5 POSITIVE URINE DRUG SCREEN: Status: ACTIVE | Noted: 2023-12-15

## 2023-12-15 LAB
ABO GROUP (TYPE) IN BLOOD: NORMAL
AMPHETAMINES UR QL SCN: ABNORMAL
ANTIBODY SCREEN: NORMAL
BARBITURATES UR QL SCN: ABNORMAL
BENZODIAZ UR QL SCN: ABNORMAL
BZE UR QL SCN: ABNORMAL
CANNABINOIDS UR QL SCN: ABNORMAL
FENTANYL+NORFENTANYL UR QL SCN: ABNORMAL
OPIATES UR QL SCN: ABNORMAL
OXYCODONE+OXYMORPHONE UR QL SCN: ABNORMAL
PCP UR QL SCN: ABNORMAL
RH FACTOR (ANTIGEN D): NORMAL

## 2023-12-15 PROCEDURE — 96372 THER/PROPH/DIAG INJ SC/IM: CPT

## 2023-12-15 PROCEDURE — 59025 FETAL NON-STRESS TEST: CPT | Mod: GC

## 2023-12-15 PROCEDURE — 80307 DRUG TEST PRSMV CHEM ANLYZR: CPT

## 2023-12-15 PROCEDURE — 59025 FETAL NON-STRESS TEST: CPT

## 2023-12-15 PROCEDURE — 76818 FETAL BIOPHYS PROFILE W/NST: CPT | Performed by: STUDENT IN AN ORGANIZED HEALTH CARE EDUCATION/TRAINING PROGRAM

## 2023-12-15 PROCEDURE — 99239 HOSP IP/OBS DSCHRG MGMT >30: CPT

## 2023-12-15 PROCEDURE — 99199 UNLISTED SPECIAL SVC PX/RPRT: CPT

## 2023-12-15 PROCEDURE — 76818 FETAL BIOPHYS PROFILE W/NST: CPT

## 2023-12-15 PROCEDURE — 86850 RBC ANTIBODY SCREEN: CPT

## 2023-12-15 PROCEDURE — 2500000004 HC RX 250 GENERAL PHARMACY W/ HCPCS (ALT 636 FOR OP/ED)

## 2023-12-15 PROCEDURE — 76821 MIDDLE CEREBRAL ARTERY ECHO: CPT | Performed by: STUDENT IN AN ORGANIZED HEALTH CARE EDUCATION/TRAINING PROGRAM

## 2023-12-15 PROCEDURE — 76816 OB US FOLLOW-UP PER FETUS: CPT | Performed by: STUDENT IN AN ORGANIZED HEALTH CARE EDUCATION/TRAINING PROGRAM

## 2023-12-15 PROCEDURE — 76816 OB US FOLLOW-UP PER FETUS: CPT

## 2023-12-15 PROCEDURE — 76819 FETAL BIOPHYS PROFIL W/O NST: CPT | Performed by: STUDENT IN AN ORGANIZED HEALTH CARE EDUCATION/TRAINING PROGRAM

## 2023-12-15 PROCEDURE — 36415 COLL VENOUS BLD VENIPUNCTURE: CPT

## 2023-12-15 PROCEDURE — 76820 UMBILICAL ARTERY ECHO: CPT

## 2023-12-15 PROCEDURE — 2500000001 HC RX 250 WO HCPCS SELF ADMINISTERED DRUGS (ALT 637 FOR MEDICARE OP)

## 2023-12-15 PROCEDURE — 76820 UMBILICAL ARTERY ECHO: CPT | Performed by: STUDENT IN AN ORGANIZED HEALTH CARE EDUCATION/TRAINING PROGRAM

## 2023-12-15 RX ORDER — ONDANSETRON HYDROCHLORIDE 2 MG/ML
4 INJECTION, SOLUTION INTRAVENOUS EVERY 6 HOURS PRN
Status: DISCONTINUED | OUTPATIENT
Start: 2023-12-15 | End: 2023-12-15 | Stop reason: HOSPADM

## 2023-12-15 RX ORDER — LIDOCAINE HYDROCHLORIDE 10 MG/ML
0.5 INJECTION INFILTRATION; PERINEURAL ONCE AS NEEDED
Status: DISCONTINUED | OUTPATIENT
Start: 2023-12-15 | End: 2023-12-15 | Stop reason: HOSPADM

## 2023-12-15 RX ORDER — ONDANSETRON 4 MG/1
4 TABLET, FILM COATED ORAL EVERY 6 HOURS PRN
Status: DISCONTINUED | OUTPATIENT
Start: 2023-12-15 | End: 2023-12-15 | Stop reason: HOSPADM

## 2023-12-15 RX ORDER — HYDRALAZINE HYDROCHLORIDE 20 MG/ML
5 INJECTION INTRAMUSCULAR; INTRAVENOUS ONCE AS NEEDED
Status: DISCONTINUED | OUTPATIENT
Start: 2023-12-15 | End: 2023-12-15 | Stop reason: HOSPADM

## 2023-12-15 RX ORDER — ADHESIVE BANDAGE
10 BANDAGE TOPICAL
Status: DISCONTINUED | OUTPATIENT
Start: 2023-12-15 | End: 2023-12-15 | Stop reason: HOSPADM

## 2023-12-15 RX ORDER — LABETALOL HYDROCHLORIDE 5 MG/ML
20 INJECTION, SOLUTION INTRAVENOUS ONCE AS NEEDED
Status: DISCONTINUED | OUTPATIENT
Start: 2023-12-15 | End: 2023-12-15 | Stop reason: HOSPADM

## 2023-12-15 RX ORDER — POLYETHYLENE GLYCOL 3350 17 G/17G
17 POWDER, FOR SOLUTION ORAL 2 TIMES DAILY PRN
Status: DISCONTINUED | OUTPATIENT
Start: 2023-12-15 | End: 2023-12-15 | Stop reason: HOSPADM

## 2023-12-15 RX ORDER — BISACODYL 10 MG/1
10 SUPPOSITORY RECTAL DAILY PRN
Status: DISCONTINUED | OUTPATIENT
Start: 2023-12-15 | End: 2023-12-15 | Stop reason: HOSPADM

## 2023-12-15 RX ORDER — NIFEDIPINE 10 MG/1
10 CAPSULE ORAL ONCE AS NEEDED
Status: DISCONTINUED | OUTPATIENT
Start: 2023-12-15 | End: 2023-12-15 | Stop reason: HOSPADM

## 2023-12-15 RX ORDER — BETAMETHASONE SODIUM PHOSPHATE AND BETAMETHASONE ACETATE 3; 3 MG/ML; MG/ML
12 INJECTION, SUSPENSION INTRA-ARTICULAR; INTRALESIONAL; INTRAMUSCULAR; SOFT TISSUE ONCE
Status: COMPLETED | OUTPATIENT
Start: 2023-12-15 | End: 2023-12-15

## 2023-12-15 RX ORDER — SIMETHICONE 80 MG
80 TABLET,CHEWABLE ORAL 4 TIMES DAILY PRN
Status: DISCONTINUED | OUTPATIENT
Start: 2023-12-15 | End: 2023-12-15 | Stop reason: HOSPADM

## 2023-12-15 RX ORDER — METOCLOPRAMIDE 10 MG/1
10 TABLET ORAL EVERY 6 HOURS PRN
Status: DISCONTINUED | OUTPATIENT
Start: 2023-12-15 | End: 2023-12-15 | Stop reason: HOSPADM

## 2023-12-15 RX ORDER — METOCLOPRAMIDE HYDROCHLORIDE 5 MG/ML
10 INJECTION INTRAMUSCULAR; INTRAVENOUS EVERY 6 HOURS PRN
Status: DISCONTINUED | OUTPATIENT
Start: 2023-12-15 | End: 2023-12-15 | Stop reason: HOSPADM

## 2023-12-15 RX ADMIN — PRENATAL VIT W/ FE FUMARATE-FA TAB 27-0.8 MG 1 TABLET: 27-0.8 TAB at 08:53

## 2023-12-15 RX ADMIN — BETAMETHASONE SODIUM PHOSPHATE AND BETAMETHASONE ACETATE 12 MG: 3; 3 INJECTION, SUSPENSION INTRA-ARTICULAR; INTRALESIONAL; INTRAMUSCULAR at 16:13

## 2023-12-15 ASSESSMENT — PAIN - FUNCTIONAL ASSESSMENT: PAIN_FUNCTIONAL_ASSESSMENT: 0-10

## 2023-12-15 ASSESSMENT — PAIN SCALES - GENERAL
PAINLEVEL_OUTOF10: 2
PAINLEVEL_OUTOF10: 0 - NO PAIN

## 2023-12-15 NOTE — PROGRESS NOTES
Emilia Link is a 28 y.o. female on day 1 of admission presenting with Threatened  labor, antepartum.    Subjective   Patient denying any contractions since transfer up to [facility] at 0200 last night. Is comfortable. No LOF or VB. Reports good FM. No complaints at this time.       Objective     Physical Exam  Vitals reviewed. Exam conducted with a chaperone present.   Constitutional:       General: She is not in acute distress.     Appearance: She is not toxic-appearing.   HENT:      Head: Normocephalic and atraumatic.      Nose: Nose normal.      Mouth/Throat:      Mouth: Mucous membranes are moist.   Eyes:      Extraocular Movements: Extraocular movements intact.      Conjunctiva/sclera: Conjunctivae normal.      Pupils: Pupils are equal, round, and reactive to light.   Cardiovascular:      Rate and Rhythm: Normal rate and regular rhythm.   Pulmonary:      Effort: Pulmonary effort is normal.      Breath sounds: Normal breath sounds.   Abdominal:      Palpations: Abdomen is soft.   Genitourinary:     Comments: FHT: 120bpm, moderate variability, +accels, -decels  [place]: mild irritability    Skin:     General: Skin is warm and dry.   Neurological:      General: No focal deficit present.      Mental Status: She is alert.   Psychiatric:         Mood and Affect: Mood normal.       Last Recorded Vitals  Blood pressure 103/65, pulse 84, temperature 37 °C (98.6 °F), temperature source Temporal, resp. rate 18, last menstrual period 2023, SpO2 97 %.  Intake/Output last 3 Shifts:  I/O last 3 completed shifts:  In: 406 [I.V.:406]  Out: 1300 [Urine:1300]       Assessment/Plan   Principal Problem:    Threatened  labor, antepartum  27yo  at 28w3d (12/15) who is admitted for threatened  labor.    #Threatened PTL   - Painful contractions q7-8 minutes on presentation to [facility], subsequently increasing to q3-4 minutes. No concern for PPROM at this time as patient denies LOF   - SVE /-3 per  examiner at OSH, 1/50/-3 on presentation  - unchanged at 2025, 0000, and 0200  - ctx now stopped  - Transferred on magnesium which was started at 1650 for fetal neuroprotection, now dc'd  - PCN started for GBS unk, collected prior to transfer, now dc'd  - BMZ#1 at 1615 on 12/14, for second dose in 24 hours. 1hr gtt wnl      #IUP at 28.2wga   - T&S 12/14  - Cephalic 12/14  - Last SVE 1/80/-3  - BMZ 12/14, for second dose today at 1615  - GBS unknown, collected on 12/14, pending   - Last US (10/11): EFW 271g (39%ile), AC 31%ile, growth US today   - 1hr GTT WNL   - VAIBHAV 3/5/2024 by LMP c/w 9 wk US    #positive UDS  - UDS collected at OSH prior to transfer positive for fentanyl and opiates, no opioid meds given prior to transfer  - pt denies all drug and EtOH use, requested retest  - repeat UDS pending    Discharge: today pending repeat UDS         Seen and discussed w/ Dr. Sepideh Carter MD PGY-1  Maternal Fetal Medicine  Pager [phone]
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
## 2023-12-15 NOTE — DISCHARGE SUMMARY
Discharge Summary    Admission Date: 2023  Discharge Date: 12/15/2023    Discharge Diagnosis  Threatened  labor, antepartum  Fetal growth restriction   Uterine Leiomyoma     Hospital Course  27 yo  presented on  at at 28.2 wks GA by LMP cw 8w us with right sided cramping and abdominal pain and cervical exam of 1/80/-3. She was started on magnesium for fetal neuroprotection and received BMZ on  and 12/15. Her cervical exam remained unchanged at 1/50/-3 on presentation to [facility] and her pain resolved. Of note patient has 8-10cm fibroid at side of her pain. Patient observed overnight and on 12/15 had growth US notable for new FGR with EFW 1080g 12%, AC<1% with normal dopplers. Plan for weekly BPPs with dopplers and serial growth ultrasounds with patient. By HD#2 patient was stable for discharge home with close outpatient follow up and weekly  testing as above.     From the HOT:  29yo  at 28.2wga by LMP c/w 9 wk US presenting as a transfer from [facility] for concern for  labor. Found to be 1/80/-3, received BMZ #1 at 1615 and Mg started at 1650. GBS was collected, PCN started. Now feeling more comfortable, denying regular contractions. No VB or LOF. Good fetal movement.    Pregnancy notable for:   - Large fibroids, largest 10 x 8 x 6cm, for serial growth US     Ob:   - SAB () @ 4wga   Gyn: denies   PMH: denies   PSH: D&C  Meds: PNV, bASA   Allergies: motrin   Family Hx: none   Social: denies t/e/d     Discharge Meds     Your medication list        CONTINUE taking these medications        Instructions Last Dose Given Next Dose Due   aspirin 81 mg EC tablet           PRENATAL 19 ORAL                     Complications Requiring Follow-Up  sFGR     Test Results Pending At Discharge  Pending Labs       No current pending labs.            Outpatient Follow-Up  Future Appointments   Date Time Provider Department Center   2023  1:15 PM Tati Morrison MD [id number]  [place]   1/5/2024  9:15 AM Tati Morrison MD [id number] [place]   1/19/2024  9:15 AM Tati Morrison MD [id number] [place]       Pt seen and dw Dr Sepideh Morejon MD

## 2023-12-15 NOTE — SIGNIFICANT EVENT
House officer at bedside for repeat SVE. Magnesium and PCN discontinued at 2215. 1/50/-3 on presentation at 2025 and again at 0000. Found to be unchanged again at 0200. Patient reporting that contractions have now stopped and is comfortable, therefore transfer to Antepartum unit deemed appropriate at that time. Planning for MFM consult and SSUS in AM.     Urine drug screen collected at OSH prior to transfer positive for fentanyl and opiates. No opioid meds given prior to transfer. Upon discussion of results with patient, she denies all drug and EtOH use. Also denying all withdrawal symptoms. Patient wishing to repeat UDS, which is ordered and pending.     D/w Sonal Carr and Mae Lucero MD, PGY-2

## 2023-12-16 LAB — BACTERIA UR CULT: NO GROWTH

## 2023-12-17 LAB — GP B STREP GENITAL QL CULT: ABNORMAL

## 2023-12-17 NOTE — SIGNIFICANT EVENT
Follow-up Phone Call Note:   Interview:  Care Type: Women's Health   Phone Number Call  [phone]   Call Outcome: Connected with patient   Patient Reports Feeling (symptoms) Are: better   Which Meds Were New Meds:  No-no meds prescribed   Which Meds to Continue:  Yes   Which Meds to Stop: no medications were discontinued   Who participated in medication reconciliation with the hospital staff?: you   In your professional opinion do you think there was a medication discrepancy or potential for medication discrepancy in this situation?: no   Medication Issues: no medication issues   Discharge Instructions Clear:   Yes   Patient Has a Primary Care Provider:     Yes   Post-hospitalization Follow-up Occurred According To Schedule:    No   Reason: appointment scheduled in future   Delivered Baby(ies): No      Mom's Discharge Date: 12/17/23 at 1430   Date the Baby Was Seen By a Health Care Provider After Discharge:    Patient Has Plan Specific Name And Number Of Who To Call For Concerns:  Yes   Stroke Patient:  No  Comments:    Date/Time Of Call: 12/17/23 at 1430   Call Back Done By: care coordinator   Callback Complete:  Yes

## 2023-12-18 ENCOUNTER — ROUTINE PRENATAL (OUTPATIENT)
Dept: OBSTETRICS AND GYNECOLOGY | Facility: CLINIC | Age: 28
End: 2023-12-18
Payer: MEDICAID

## 2023-12-18 ENCOUNTER — APPOINTMENT (OUTPATIENT)
Dept: OBSTETRICS AND GYNECOLOGY | Facility: CLINIC | Age: 28
End: 2023-12-18
Payer: MEDICAID

## 2023-12-18 VITALS — WEIGHT: 114 LBS | SYSTOLIC BLOOD PRESSURE: 102 MMHG | BODY MASS INDEX: 21.54 KG/M2 | DIASTOLIC BLOOD PRESSURE: 54 MMHG

## 2023-12-18 DIAGNOSIS — D25.9 LEIOMYOMA OF UTERUS AFFECTING PREGNANCY IN SECOND TRIMESTER (HHS-HCC): ICD-10-CM

## 2023-12-18 DIAGNOSIS — D57.3 SICKLE CELL TRAIT IN MOTHER AFFECTING PREGNANCY (MULTI): ICD-10-CM

## 2023-12-18 DIAGNOSIS — O36.5990 FETAL GROWTH RESTRICTION ANTEPARTUM (HHS-HCC): ICD-10-CM

## 2023-12-18 DIAGNOSIS — O99.019 SICKLE CELL TRAIT IN MOTHER AFFECTING PREGNANCY (MULTI): ICD-10-CM

## 2023-12-18 DIAGNOSIS — T40.411A: ICD-10-CM

## 2023-12-18 DIAGNOSIS — O34.12 LEIOMYOMA OF UTERUS AFFECTING PREGNANCY IN SECOND TRIMESTER (HHS-HCC): ICD-10-CM

## 2023-12-18 DIAGNOSIS — Z23 NEED FOR TDAP VACCINATION: ICD-10-CM

## 2023-12-18 DIAGNOSIS — Z34.83 PRENATAL CARE, SUBSEQUENT PREGNANCY IN THIRD TRIMESTER (HHS-HCC): Primary | ICD-10-CM

## 2023-12-18 DIAGNOSIS — Z3A.28 28 WEEKS GESTATION OF PREGNANCY (HHS-HCC): Primary | ICD-10-CM

## 2023-12-18 DIAGNOSIS — R82.5 POSITIVE URINE DRUG SCREEN: ICD-10-CM

## 2023-12-18 LAB
AMPHETAMINES UR QL SCN: ABNORMAL
BARBITURATES UR QL SCN: ABNORMAL
BENZODIAZ UR QL SCN: ABNORMAL
BZE UR QL SCN: ABNORMAL
CANNABINOIDS UR QL SCN: ABNORMAL
FENTANYL+NORFENTANYL UR QL SCN: ABNORMAL
OPIATES UR QL SCN: ABNORMAL
OXYCODONE+OXYMORPHONE UR QL SCN: ABNORMAL
PCP UR QL SCN: ABNORMAL

## 2023-12-18 PROCEDURE — 80307 DRUG TEST PRSMV CHEM ANLYZR: CPT

## 2023-12-18 PROCEDURE — 90471 IMMUNIZATION ADMIN: CPT | Performed by: OBSTETRICS & GYNECOLOGY

## 2023-12-18 PROCEDURE — 80354 DRUG SCREENING FENTANYL: CPT

## 2023-12-18 PROCEDURE — 90715 TDAP VACCINE 7 YRS/> IM: CPT | Performed by: OBSTETRICS & GYNECOLOGY

## 2023-12-18 PROCEDURE — 99213 OFFICE O/P EST LOW 20 MIN: CPT | Performed by: OBSTETRICS & GYNECOLOGY

## 2023-12-18 ASSESSMENT — EDINBURGH POSTNATAL DEPRESSION SCALE (EPDS)
I HAVE BEEN ABLE TO LAUGH AND SEE THE FUNNY SIDE OF THINGS: AS MUCH AS I ALWAYS COULD
I HAVE BEEN ANXIOUS OR WORRIED FOR NO GOOD REASON: YES, SOMETIMES
I HAVE BEEN SO UNHAPPY THAT I HAVE HAD DIFFICULTY SLEEPING: NOT VERY OFTEN
I HAVE LOOKED FORWARD WITH ENJOYMENT TO THINGS: AS MUCH AS I EVER DID
I HAVE BEEN SO UNHAPPY THAT I HAVE BEEN CRYING: ONLY OCCASIONALLY
THE THOUGHT OF HARMING MYSELF HAS OCCURRED TO ME: NEVER
I HAVE FELT SAD OR MISERABLE: NOT VERY OFTEN
I HAVE BLAMED MYSELF UNNECESSARILY WHEN THINGS WENT WRONG: YES, SOME OF THE TIME
I HAVE FELT SCARED OR PANICKY FOR NO GOOD REASON: NO, NOT MUCH
THINGS HAVE BEEN GETTING ON TOP OF ME: NO, MOST OF THE TIME I HAVE COPED QUITE WELL
TOTAL SCORE: 9

## 2023-12-18 NOTE — PROGRESS NOTES
BF2 given  EPDS= 9  Pt would like to discuss TDAP injection  Pt was in ED 12.15.23 for pelvic pain. States she was told she is 50% effaced and 1cm dialated  Pt urine on 12.15.23 tested pos for fentanyl. Tox screen re sent  TDAP injected today

## 2023-12-19 DIAGNOSIS — O36.5990 FETAL GROWTH RESTRICTION ANTEPARTUM (HHS-HCC): Primary | ICD-10-CM

## 2024-01-02 ENCOUNTER — APPOINTMENT (OUTPATIENT)
Dept: OBSTETRICS AND GYNECOLOGY | Facility: CLINIC | Age: 29
End: 2024-01-02
Payer: MEDICAID

## 2024-01-03 ENCOUNTER — ANCILLARY PROCEDURE (OUTPATIENT)
Dept: RADIOLOGY | Facility: CLINIC | Age: 29
End: 2024-01-03
Payer: MEDICAID

## 2024-01-03 DIAGNOSIS — O36.5930 MATERNAL CARE FOR OTHER KNOWN OR SUSPECTED POOR FETAL GROWTH, THIRD TRIMESTER, NOT APPLICABLE OR UNSPECIFIED (HHS-HCC): ICD-10-CM

## 2024-01-03 DIAGNOSIS — Z34.90 PREGNANT (HHS-HCC): ICD-10-CM

## 2024-01-03 LAB
6MAM UR CFM-MCNC: <25 NG/ML
CODEINE UR CFM-MCNC: <50 NG/ML
FENTANYL UR CFM-MCNC: <2.5 NG/ML
FENTANYL UR CFM-MCNC: <2.5 NG/ML
HYDROCODONE CTO UR CFM-MCNC: <25 NG/ML
HYDROMORPHONE UR CFM-MCNC: <25 NG/ML
MORPHINE UR CFM-MCNC: 193 NG/ML
NORFENTANYL UR CFM-MCNC: 4.3 NG/ML
NORFENTANYL UR CFM-MCNC: 5.3 NG/ML
NORHYDROCODONE UR CFM-MCNC: <25 NG/ML
NOROXYCODONE UR CFM-MCNC: <25 NG/ML
OXYCODONE UR CFM-MCNC: <25 NG/ML
OXYMORPHONE UR CFM-MCNC: <25 NG/ML

## 2024-01-03 PROCEDURE — 76816 OB US FOLLOW-UP PER FETUS: CPT

## 2024-01-03 PROCEDURE — 76816 OB US FOLLOW-UP PER FETUS: CPT | Performed by: OBSTETRICS & GYNECOLOGY

## 2024-01-03 PROCEDURE — 76820 UMBILICAL ARTERY ECHO: CPT

## 2024-01-03 PROCEDURE — 76820 UMBILICAL ARTERY ECHO: CPT | Performed by: OBSTETRICS & GYNECOLOGY

## 2024-01-04 ENCOUNTER — TELEPHONE (OUTPATIENT)
Dept: GENETICS | Facility: CLINIC | Age: 29
End: 2024-01-04
Payer: MEDICAID

## 2024-01-04 NOTE — TELEPHONE ENCOUNTER
"1/4/24: We discussed that the patient's microarray and Fragile X syndrome testing were normal.  Limitations were reviewed.  The patient's cell-free DNA analysis for common aneuploidies was negative.  Limitations of the screen were reviewed.  Patient verbalized understanding.    The patient's [place] screen came back that she was a carrier of sickle cell trait (which was known).  Placental DNA screening provided a \"low risk\" for the fetus to be affected at 1 in 3,200.  Limitations to this screen were reviewed.      If the patient desires a further genetic assessment for her learning delays in the future, an appointment can be made by calling [phone].  We also strongly recommend that the patient's partner have genetic counseling to consent/discuss carrier screening for hemoglobinopathies/thalassemias.  An appointment can be made by calling the number above.  The patient verbalized understanding.    Nelsy Hill MS  Licensed Genetic Counselor  "

## 2024-01-05 ENCOUNTER — ROUTINE PRENATAL (OUTPATIENT)
Dept: OBSTETRICS AND GYNECOLOGY | Facility: CLINIC | Age: 29
End: 2024-01-05
Payer: MEDICAID

## 2024-01-05 VITALS — SYSTOLIC BLOOD PRESSURE: 110 MMHG | DIASTOLIC BLOOD PRESSURE: 62 MMHG | BODY MASS INDEX: 21.54 KG/M2 | WEIGHT: 114 LBS

## 2024-01-05 DIAGNOSIS — O34.12 LEIOMYOMA OF UTERUS AFFECTING PREGNANCY IN SECOND TRIMESTER (HHS-HCC): ICD-10-CM

## 2024-01-05 DIAGNOSIS — O99.019 SICKLE CELL TRAIT IN MOTHER AFFECTING PREGNANCY (MULTI): ICD-10-CM

## 2024-01-05 DIAGNOSIS — O36.5990 FETAL GROWTH RESTRICTION ANTEPARTUM (HHS-HCC): ICD-10-CM

## 2024-01-05 DIAGNOSIS — O47.00: ICD-10-CM

## 2024-01-05 DIAGNOSIS — Z34.82 PRENATAL CARE, SUBSEQUENT PREGNANCY IN SECOND TRIMESTER (HHS-HCC): Primary | ICD-10-CM

## 2024-01-05 DIAGNOSIS — R82.5 POSITIVE URINE DRUG SCREEN: ICD-10-CM

## 2024-01-05 DIAGNOSIS — D25.9 LEIOMYOMA OF UTERUS AFFECTING PREGNANCY IN SECOND TRIMESTER (HHS-HCC): ICD-10-CM

## 2024-01-05 DIAGNOSIS — D57.3 SICKLE CELL TRAIT IN MOTHER AFFECTING PREGNANCY (MULTI): ICD-10-CM

## 2024-01-05 PROCEDURE — 80307 DRUG TEST PRSMV CHEM ANLYZR: CPT

## 2024-01-05 PROCEDURE — 99213 OFFICE O/P EST LOW 20 MIN: CPT | Performed by: OBSTETRICS & GYNECOLOGY

## 2024-01-05 PROCEDURE — 80354 DRUG SCREENING FENTANYL: CPT

## 2024-01-05 NOTE — PROGRESS NOTES
OB Follow up visit    ASSESSMENT & PLAN    Emilia Link is a 28 y.o.  at 31w3d presenting for routine prenatal care    Problem List Items Addressed This Visit       Prenatal care, subsequent pregnancy in second trimester - Primary    Overview     [x] Dating: LMP consistent with 9 week ultrasound  [x] Initial BMI: 19  [x] Prenatal Labs: O+, rubella immune, Hgb 11.5 (second trimester 11.0)  [x] Pap: NILM/neg HPV 2023  [x] Aneuploidy Screening:  initial cfDNA result inconclusive, repeat was risk reducing, microarray was normal  [x] Baby ASA: No  [x] Anatomy US: Normal fetal anatomy, large fibroids   [x] 1hr GCT at 24-28wks: normal at 111  [x] Tdap (27-36wks): 23  [x] Flu Shot: Discussed, declines  [x] COVID vaccine: Discussed, declines  [] GBS at 36 wks:  [] Breastfeeding  [] PPBC:   [x] 39 weeks discussion of IOL vs. Expectant management: discussed likely delivery between 37-39 weeks gestation depending on testing for FGR  [x] Mode of delivery: vaginal            Sickle cell trait in mother affecting pregnancy (CMS/HCC)    Overview     Partner has not been tested. She will discuss with partner.         Leiomyoma of uterus affecting pregnancy in second trimester    Overview     Large uterine fibroids, largest [id number]  Stable size  Plan for serial growth this pregnancy         Threatened  labor, antepartum    Overview     : right sided abdominal pain, cervix 1/80/-3 -> 1/50/-3   S/p BMZ -12/15         Fetal growth restriction antepartum    Overview     12/15: growth with EFW 1080g 12%, AC<1%  1/3/24: EFW 18%tile, AC 2%tile, normal dopplers  Continue weekly BPPs and dopplers   Serial growth US          Positive urine drug screen    Overview     Patient did not consent to UDS and ordered for unclear purpose  Confirmatory testing for norfentanyl positive on  and .   Patient denies any history of substance use, no opioids given at OSH, not taking home meds besides PNV and  ASA, no recent exposures she is aware of  Patient desires repeat drug screen today           Relevant Orders    Drug Screen, Urine With Reflex to Confirmation        Orders Placed This Encounter   Procedures    Drug Screen, Urine With Reflex to Confirmation     Order Specific Question:   Release result to [place]     Answer:   Immediate [1]        RTC in 2 weeks      SUBJECTIVE    HPI: Emilia Link is a 28 y.o.  at 31w3d here for RPNV. Denies contractions, bleeding, or LOF. Reports normal fetal movement. Patient has no new concerns today    OBJECTIVE    Visit Vitals  /62   Wt 51.7 kg (114 lb)   LMP 2023   BMI 21.54 kg/m²   OB Status Pregnant   Smoking Status Never   BSA 1.49 m²        Tati Morrison MD

## 2024-01-08 ENCOUNTER — TELEPHONE (OUTPATIENT)
Dept: OBSTETRICS AND GYNECOLOGY | Facility: CLINIC | Age: 29
End: 2024-01-08
Payer: MEDICAID

## 2024-01-08 NOTE — TELEPHONE ENCOUNTER
Patient phoned with c/o bleeding since Friday 1/5/23.  Had intercourse 5 days ago.  It is brown and only when wiping.  Denies cramping or active bleeding.  + fetal movement.  Advised to monitor and call back with an increase or bright red bleeding.

## 2024-01-09 ENCOUNTER — DOCUMENTATION (OUTPATIENT)
Dept: OBSTETRICS AND GYNECOLOGY | Facility: CLINIC | Age: 29
End: 2024-01-09
Payer: MEDICAID

## 2024-01-09 DIAGNOSIS — Z34.82 PRENATAL CARE, SUBSEQUENT PREGNANCY IN SECOND TRIMESTER (HHS-HCC): Primary | ICD-10-CM

## 2024-01-10 ENCOUNTER — ANCILLARY PROCEDURE (OUTPATIENT)
Dept: RADIOLOGY | Facility: CLINIC | Age: 29
End: 2024-01-10
Payer: MEDICAID

## 2024-01-10 DIAGNOSIS — O36.5990 FETAL GROWTH RESTRICTION ANTEPARTUM (HHS-HCC): ICD-10-CM

## 2024-01-10 DIAGNOSIS — Z34.90 PREGNANT (HHS-HCC): ICD-10-CM

## 2024-01-10 PROCEDURE — 76819 FETAL BIOPHYS PROFIL W/O NST: CPT | Performed by: OBSTETRICS & GYNECOLOGY

## 2024-01-10 PROCEDURE — 76820 UMBILICAL ARTERY ECHO: CPT | Performed by: OBSTETRICS & GYNECOLOGY

## 2024-01-10 PROCEDURE — 76820 UMBILICAL ARTERY ECHO: CPT

## 2024-01-10 PROCEDURE — 76819 FETAL BIOPHYS PROFIL W/O NST: CPT

## 2024-01-15 ENCOUNTER — APPOINTMENT (OUTPATIENT)
Dept: OBSTETRICS AND GYNECOLOGY | Facility: CLINIC | Age: 29
End: 2024-01-15
Payer: MEDICAID

## 2024-01-17 ENCOUNTER — ANCILLARY PROCEDURE (OUTPATIENT)
Dept: RADIOLOGY | Facility: CLINIC | Age: 29
End: 2024-01-17
Payer: MEDICAID

## 2024-01-17 DIAGNOSIS — Z34.90 PREGNANT (HHS-HCC): ICD-10-CM

## 2024-01-17 DIAGNOSIS — O36.5990 FETAL GROWTH RESTRICTION ANTEPARTUM (HHS-HCC): ICD-10-CM

## 2024-01-17 PROCEDURE — 76820 UMBILICAL ARTERY ECHO: CPT

## 2024-01-17 PROCEDURE — 76819 FETAL BIOPHYS PROFIL W/O NST: CPT

## 2024-01-17 PROCEDURE — 76820 UMBILICAL ARTERY ECHO: CPT | Performed by: STUDENT IN AN ORGANIZED HEALTH CARE EDUCATION/TRAINING PROGRAM

## 2024-01-17 PROCEDURE — 76819 FETAL BIOPHYS PROFIL W/O NST: CPT | Performed by: STUDENT IN AN ORGANIZED HEALTH CARE EDUCATION/TRAINING PROGRAM

## 2024-01-19 ENCOUNTER — ROUTINE PRENATAL (OUTPATIENT)
Dept: OBSTETRICS AND GYNECOLOGY | Facility: CLINIC | Age: 29
End: 2024-01-19
Payer: MEDICAID

## 2024-01-19 ENCOUNTER — APPOINTMENT (OUTPATIENT)
Dept: OBSTETRICS AND GYNECOLOGY | Facility: CLINIC | Age: 29
End: 2024-01-19
Payer: MEDICAID

## 2024-01-19 VITALS — WEIGHT: 115.4 LBS | BODY MASS INDEX: 21.8 KG/M2 | SYSTOLIC BLOOD PRESSURE: 98 MMHG | DIASTOLIC BLOOD PRESSURE: 60 MMHG

## 2024-01-19 DIAGNOSIS — R82.5 POSITIVE URINE DRUG SCREEN: ICD-10-CM

## 2024-01-19 DIAGNOSIS — O36.5990 FETAL GROWTH RESTRICTION ANTEPARTUM (HHS-HCC): ICD-10-CM

## 2024-01-19 DIAGNOSIS — O34.12 LEIOMYOMA OF UTERUS AFFECTING PREGNANCY IN SECOND TRIMESTER (HHS-HCC): ICD-10-CM

## 2024-01-19 DIAGNOSIS — Z34.83 PRENATAL CARE, SUBSEQUENT PREGNANCY IN THIRD TRIMESTER (HHS-HCC): Primary | ICD-10-CM

## 2024-01-19 DIAGNOSIS — O99.019 SICKLE CELL TRAIT IN MOTHER AFFECTING PREGNANCY (MULTI): ICD-10-CM

## 2024-01-19 DIAGNOSIS — D57.3 SICKLE CELL TRAIT IN MOTHER AFFECTING PREGNANCY (MULTI): ICD-10-CM

## 2024-01-19 DIAGNOSIS — D25.9 LEIOMYOMA OF UTERUS AFFECTING PREGNANCY IN SECOND TRIMESTER (HHS-HCC): ICD-10-CM

## 2024-01-19 DIAGNOSIS — O47.00: ICD-10-CM

## 2024-01-19 PROCEDURE — 99213 OFFICE O/P EST LOW 20 MIN: CPT | Performed by: OBSTETRICS & GYNECOLOGY

## 2024-01-19 ASSESSMENT — ENCOUNTER SYMPTOMS
MUSCULOSKELETAL NEGATIVE: 0
CONSTITUTIONAL NEGATIVE: 0
EYES NEGATIVE: 0
NEUROLOGICAL NEGATIVE: 0
GASTROINTESTINAL NEGATIVE: 0
RESPIRATORY NEGATIVE: 0
ENDOCRINE NEGATIVE: 0
ALLERGIC/IMMUNOLOGIC NEGATIVE: 0
HEMATOLOGIC/LYMPHATIC NEGATIVE: 0
CARDIOVASCULAR NEGATIVE: 0
PSYCHIATRIC NEGATIVE: 0

## 2024-01-19 NOTE — PROGRESS NOTES
OB Follow up visit    ASSESSMENT & PLAN    Emilia Link is a 28 y.o.  at 33w3d presenting for routine prenatal care    Problem List Items Addressed This Visit       Prenatal care, subsequent pregnancy in second trimester - Primary    Overview     [x] Dating: LMP consistent with 9 week ultrasound  [x] Initial BMI: 19  [x] Prenatal Labs: O+, rubella immune, Hgb 11.5 (second trimester 11.0)  [x] Pap: NILM/neg HPV 2023  [x] Aneuploidy Screening:  initial cfDNA result inconclusive, repeat was risk reducing, microarray was normal  [x] Baby ASA: No  [x] Anatomy US: Normal fetal anatomy, large fibroids   [x] 1hr GCT at 24-28wks: normal at 111  [x] Tdap (27-36wks): 23  [x] Flu Shot: Discussed, declines  [x] COVID vaccine: Discussed, declines  [] GBS at 36 wks:  [x] Breastfeeding: Yes, planning to try  [x] PPBC: Reviewed options, desires POP  [x] 39 weeks discussion of IOL vs. Expectant management: discussed likely delivery between 37-39 weeks gestation depending on testing for FGR  [x] Mode of delivery: vaginal            Sickle cell trait in mother affecting pregnancy (CMS/HCC)    Overview     Partner has not been tested. She will discuss with partner.         Leiomyoma of uterus affecting pregnancy in second trimester    Overview     Large uterine fibroids, largest [id number]  Stable size  Plan for serial growth this pregnancy         Threatened  labor, antepartum    Overview     : right sided abdominal pain, cervix 1/80/-3 -> 1/50/-3   S/p BMZ -12/15         Fetal growth restriction antepartum    Overview     12/15: growth with EFW 1080g 12%, AC<1%  1/3/24: EFW 18%tile, AC 2%tile, normal dopplers  Continue weekly BPPs and dopplers   Serial growth US          Positive urine drug screen    Overview     Patient did not consent to UDS on  and ordered for unclear purpose  Patient adamantly denies any history of substance use, no opioids given at OSH, not taking home meds besides PNV  and ASA, no recent exposures she is aware of    Discussed with pathologist and summary below: All confirmatory testing has been positive.   23:  Screen positive fentanyl and opiates,  confirmation positive fentanyl metabolite and morphine, [facility] confirmation positive fentanyl metabolite, [facility] confirmation positive for Morphine and fentanyl metabolite    24: [facility] Screen positive fentanyl, [facility] confirmation positive fentanyl metabolite, [facility] confirmation positive fentanyl metabolite    24: [facility] screen positive fentanyl,  confirmation negative fentanyl (cutoff of 2.5 ng/mL), [facility] confirmation positive fentanyl (Norfentanyl at 1.3 ng/mL, cutoff 1.0 ng/mL).  This discrepancy is explained due to the fact that the  screen cutoff is 1.0 ng/mL, [facility] confirmation cutoff is 2.5 ng/mL.  The concentration fell between the screen and our confirmatory cutoff - a level is present but below our detection limit.  The Delta test has lower cutoff, so the result was positive.  The confirmatory test is being reported as POSITIVE using the Delta result.                RTC in 2 weeks      SUBJECTIVE    HPI: Emilia Link is a 28 y.o.  at 33w3d here for RPNV. Denies contractions, bleeding, or LOF. Reports normal fetal movement. Patient has no new complaints.      OBJECTIVE    Visit Vitals  BP 98/60   Wt 52.3 kg (115 lb 6.4 oz)   LMP 2023   BMI 21.80 kg/m²   OB Status Pregnant   Smoking Status Never   BSA 1.5 m²        Tati Morrison MD

## 2024-01-24 ENCOUNTER — HOSPITAL ENCOUNTER (OUTPATIENT)
Dept: RADIOLOGY | Facility: CLINIC | Age: 29
Discharge: HOME | End: 2024-01-24
Payer: MEDICAID

## 2024-01-24 DIAGNOSIS — O36.5990 FETAL GROWTH RESTRICTION ANTEPARTUM (HHS-HCC): ICD-10-CM

## 2024-01-24 PROCEDURE — 76816 OB US FOLLOW-UP PER FETUS: CPT | Performed by: OBSTETRICS & GYNECOLOGY

## 2024-01-24 PROCEDURE — 76816 OB US FOLLOW-UP PER FETUS: CPT

## 2024-01-24 PROCEDURE — 76820 UMBILICAL ARTERY ECHO: CPT | Performed by: OBSTETRICS & GYNECOLOGY

## 2024-01-24 PROCEDURE — 76819 FETAL BIOPHYS PROFIL W/O NST: CPT

## 2024-01-24 PROCEDURE — 76819 FETAL BIOPHYS PROFIL W/O NST: CPT | Performed by: OBSTETRICS & GYNECOLOGY

## 2024-01-24 PROCEDURE — 76820 UMBILICAL ARTERY ECHO: CPT

## 2024-01-29 LAB
FENTANYL UR CFM-MCNC: <2.5 NG/ML
NORFENTANYL UR CFM-MCNC: NORMAL NG/ML

## 2024-01-31 ENCOUNTER — HOSPITAL ENCOUNTER (OUTPATIENT)
Dept: RADIOLOGY | Facility: CLINIC | Age: 29
Discharge: HOME | End: 2024-01-31
Payer: MEDICAID

## 2024-01-31 DIAGNOSIS — O36.5990 FETAL GROWTH RESTRICTION ANTEPARTUM (HHS-HCC): ICD-10-CM

## 2024-01-31 PROCEDURE — 76820 UMBILICAL ARTERY ECHO: CPT

## 2024-01-31 PROCEDURE — 76820 UMBILICAL ARTERY ECHO: CPT | Performed by: OBSTETRICS & GYNECOLOGY

## 2024-01-31 PROCEDURE — 76819 FETAL BIOPHYS PROFIL W/O NST: CPT

## 2024-01-31 PROCEDURE — 76819 FETAL BIOPHYS PROFIL W/O NST: CPT | Performed by: OBSTETRICS & GYNECOLOGY

## 2024-02-05 ENCOUNTER — ROUTINE PRENATAL (OUTPATIENT)
Dept: OBSTETRICS AND GYNECOLOGY | Facility: CLINIC | Age: 29
End: 2024-02-05
Payer: MEDICAID

## 2024-02-05 VITALS — SYSTOLIC BLOOD PRESSURE: 106 MMHG | WEIGHT: 117 LBS | DIASTOLIC BLOOD PRESSURE: 50 MMHG | BODY MASS INDEX: 22.11 KG/M2

## 2024-02-05 DIAGNOSIS — O47.00: ICD-10-CM

## 2024-02-05 DIAGNOSIS — D57.3 SICKLE CELL TRAIT IN MOTHER AFFECTING PREGNANCY (MULTI): ICD-10-CM

## 2024-02-05 DIAGNOSIS — O99.019 SICKLE CELL TRAIT IN MOTHER AFFECTING PREGNANCY (MULTI): ICD-10-CM

## 2024-02-05 DIAGNOSIS — R82.5 POSITIVE URINE DRUG SCREEN: ICD-10-CM

## 2024-02-05 DIAGNOSIS — O36.5990 FETAL GROWTH RESTRICTION ANTEPARTUM (HHS-HCC): ICD-10-CM

## 2024-02-05 DIAGNOSIS — Z34.82 PRENATAL CARE, SUBSEQUENT PREGNANCY IN SECOND TRIMESTER (HHS-HCC): Primary | ICD-10-CM

## 2024-02-05 DIAGNOSIS — O34.12 LEIOMYOMA OF UTERUS AFFECTING PREGNANCY IN SECOND TRIMESTER (HHS-HCC): ICD-10-CM

## 2024-02-05 DIAGNOSIS — D25.9 LEIOMYOMA OF UTERUS AFFECTING PREGNANCY IN SECOND TRIMESTER (HHS-HCC): ICD-10-CM

## 2024-02-05 LAB
AMPHETAMINES UR QL SCN: NORMAL
BARBITURATES UR QL SCN: NORMAL
BENZODIAZ UR QL SCN: NORMAL
BZE UR QL SCN: NORMAL
CANNABINOIDS UR QL SCN: NORMAL
FENTANYL+NORFENTANYL UR QL SCN: NORMAL
OPIATES UR QL SCN: NORMAL
OXYCODONE+OXYMORPHONE UR QL SCN: NORMAL
PCP UR QL SCN: NORMAL

## 2024-02-05 PROCEDURE — 80307 DRUG TEST PRSMV CHEM ANLYZR: CPT

## 2024-02-05 PROCEDURE — 87081 CULTURE SCREEN ONLY: CPT

## 2024-02-05 PROCEDURE — 99213 OFFICE O/P EST LOW 20 MIN: CPT | Performed by: OBSTETRICS & GYNECOLOGY

## 2024-02-06 DIAGNOSIS — O36.5990 FETAL GROWTH RESTRICTION ANTEPARTUM (HHS-HCC): Primary | ICD-10-CM

## 2024-02-07 ENCOUNTER — HOSPITAL ENCOUNTER (OUTPATIENT)
Dept: RADIOLOGY | Facility: CLINIC | Age: 29
Discharge: HOME | End: 2024-02-07
Payer: MEDICAID

## 2024-02-07 DIAGNOSIS — O36.5990 FETAL GROWTH RESTRICTION ANTEPARTUM (HHS-HCC): ICD-10-CM

## 2024-02-07 PROCEDURE — 76819 FETAL BIOPHYS PROFIL W/O NST: CPT | Performed by: OBSTETRICS & GYNECOLOGY

## 2024-02-07 PROCEDURE — 76819 FETAL BIOPHYS PROFIL W/O NST: CPT

## 2024-02-07 PROCEDURE — 76820 UMBILICAL ARTERY ECHO: CPT

## 2024-02-07 PROCEDURE — 76820 UMBILICAL ARTERY ECHO: CPT | Performed by: OBSTETRICS & GYNECOLOGY

## 2024-02-08 LAB — GP B STREP GENITAL QL CULT: NORMAL

## 2024-02-12 ENCOUNTER — ROUTINE PRENATAL (OUTPATIENT)
Dept: OBSTETRICS AND GYNECOLOGY | Facility: CLINIC | Age: 29
End: 2024-02-12
Payer: MEDICAID

## 2024-02-12 VITALS — WEIGHT: 119 LBS | SYSTOLIC BLOOD PRESSURE: 110 MMHG | DIASTOLIC BLOOD PRESSURE: 64 MMHG | BODY MASS INDEX: 22.48 KG/M2

## 2024-02-12 DIAGNOSIS — Z34.83 PRENATAL CARE, SUBSEQUENT PREGNANCY IN THIRD TRIMESTER (HHS-HCC): Primary | ICD-10-CM

## 2024-02-12 DIAGNOSIS — O34.12 LEIOMYOMA OF UTERUS AFFECTING PREGNANCY IN SECOND TRIMESTER (HHS-HCC): ICD-10-CM

## 2024-02-12 DIAGNOSIS — O99.019 SICKLE CELL TRAIT IN MOTHER AFFECTING PREGNANCY (MULTI): ICD-10-CM

## 2024-02-12 DIAGNOSIS — O47.00: ICD-10-CM

## 2024-02-12 DIAGNOSIS — D25.9 LEIOMYOMA OF UTERUS AFFECTING PREGNANCY IN SECOND TRIMESTER (HHS-HCC): ICD-10-CM

## 2024-02-12 DIAGNOSIS — O36.5990 FETAL GROWTH RESTRICTION ANTEPARTUM (HHS-HCC): ICD-10-CM

## 2024-02-12 DIAGNOSIS — R82.5 POSITIVE URINE DRUG SCREEN: ICD-10-CM

## 2024-02-12 DIAGNOSIS — D57.3 SICKLE CELL TRAIT IN MOTHER AFFECTING PREGNANCY (MULTI): ICD-10-CM

## 2024-02-12 PROCEDURE — 99213 OFFICE O/P EST LOW 20 MIN: CPT | Performed by: OBSTETRICS & GYNECOLOGY

## 2024-02-12 NOTE — PROGRESS NOTES
OB Follow up visit    ASSESSMENT & PLAN    Emilia Link is a 28 y.o.  at 36w6d presenting for routine prenatal care    Problem List Items Addressed This Visit       Prenatal care, subsequent pregnancy in second trimester - Primary    Overview     [x] Dating: LMP consistent with 9 week ultrasound  [x] Initial BMI: 19  [x] Prenatal Labs: O+, rubella immune, Hgb 11.5 (second trimester 11.0)  [x] Pap: NILM/neg HPV 2023  [x] Aneuploidy Screening:  initial cfDNA result inconclusive, repeat was risk reducing, microarray was normal  [x] Baby ASA: No  [x] Anatomy US: Normal fetal anatomy, large fibroids   [x] 1hr GCT at 24-28wks: normal at 111  [x] Tdap (27-36wks): given 23  [x] Flu Shot: Discussed, declines  [x] COVID vaccine: Discussed, declines  [x] GBS at 36 wks: collected 24 - negative  [x] Breastfeeding: Yes, planning to try  [x] PPBC: Reviewed options, desires POP  [x] 39 weeks discussion of IOL vs. Expectant management: Discussed with MFM and recommend delivery between 38-39 weeks gestation for FGR with EFW in 9%tile and AC 1%tile. Scheduled for IOL on , she will be 38+4.   [x] Mode of delivery: vaginal            Sickle cell trait in mother affecting pregnancy (CMS/HCC)    Overview     Partner has not been tested.   Genetic testing showed low risk of baby having sickle cell disease         Leiomyoma of uterus affecting pregnancy in second trimester    Overview     Large uterine fibroids, largest [id number]  Stable size  Plan for serial growth this pregnancy         Threatened  labor, antepartum    Overview     : right sided abdominal pain, cervix 1/80/-3 -> 1/50/-3   S/p BMZ -12/15         Fetal growth restriction antepartum    Overview     12/15: growth with EFW 1080g 12%, AC<1%  1/3/24: EFW 18%tile, AC 2%tile, normal dopplers  24: EFW 1981g 9%tile, AC <1%tile, normal dopplers  Continue weekly BPPs and dopplers   Serial growth US          Positive urine drug  screen    Overview     Patient did not consent to UDS on  and ordered for unclear purpose  Patient adamantly denies any history of substance use, no opioids given at OSH, not taking home meds besides PNV and ASA, no recent exposures she is aware of    Discussed with pathologist and summary below: All confirmatory testing has been positive until 24.  23: [facility] Screen positive fentanyl and opiates,  confirmation positive fentanyl metabolite and morphine, [facility] confirmation positive fentanyl metabolite, [place] confirmation positive for Morphine and fentanyl metabolite    24: [facility] Screen positive fentanyl, [facility] confirmation positive fentanyl metabolite, [facility] confirmation positive fentanyl metabolite    24: [facility] screen positive fentanyl,  confirmation negative fentanyl (cutoff of 2.5 ng/mL), [place] confirmation positive fentanyl (Norfentanyl at 1.3 ng/mL, cutoff 1.0 ng/mL).  This discrepancy is explained due to the fact that the  screen cutoff is 1.0 ng/mL,  confirmation cutoff is 2.5 ng/mL.  The concentration fell between the screen and our confirmatory cutoff - a level is present but below our detection limit.  The [place] test has lower cutoff, so the result was positive.  The confirmatory test is being reported as POSITIVE using the [place] result.     24: NEGATIVE               RTC in 1 week      SUBJECTIVE    HPI: Emilia Link is a 28 y.o.  at 36w6d here for RPNV. Denies contractions, bleeding, or LOF. Reports normal fetal movement. Patient without complaints      OBJECTIVE    Visit Vitals  /64   Wt 54 kg (119 lb)   LMP 2023   BMI 22.48 kg/m²   OB Status Pregnant   Smoking Status Never   BSA 1.52 m²        Tati Morrison MD

## 2024-02-14 ENCOUNTER — HOSPITAL ENCOUNTER (OUTPATIENT)
Dept: RADIOLOGY | Facility: CLINIC | Age: 29
Discharge: HOME | End: 2024-02-14
Payer: MEDICAID

## 2024-02-14 DIAGNOSIS — O36.5990 FETAL GROWTH RESTRICTION ANTEPARTUM (HHS-HCC): ICD-10-CM

## 2024-02-14 PROCEDURE — 76820 UMBILICAL ARTERY ECHO: CPT | Performed by: STUDENT IN AN ORGANIZED HEALTH CARE EDUCATION/TRAINING PROGRAM

## 2024-02-14 PROCEDURE — 76819 FETAL BIOPHYS PROFIL W/O NST: CPT | Performed by: STUDENT IN AN ORGANIZED HEALTH CARE EDUCATION/TRAINING PROGRAM

## 2024-02-14 PROCEDURE — 76816 OB US FOLLOW-UP PER FETUS: CPT

## 2024-02-14 PROCEDURE — 76820 UMBILICAL ARTERY ECHO: CPT

## 2024-02-14 PROCEDURE — 76816 OB US FOLLOW-UP PER FETUS: CPT | Performed by: STUDENT IN AN ORGANIZED HEALTH CARE EDUCATION/TRAINING PROGRAM

## 2024-02-14 PROCEDURE — 76819 FETAL BIOPHYS PROFIL W/O NST: CPT

## 2024-02-19 ENCOUNTER — ROUTINE PRENATAL (OUTPATIENT)
Dept: OBSTETRICS AND GYNECOLOGY | Facility: CLINIC | Age: 29
End: 2024-02-19
Payer: MEDICAID

## 2024-02-19 VITALS — BODY MASS INDEX: 22.67 KG/M2 | WEIGHT: 120 LBS | DIASTOLIC BLOOD PRESSURE: 52 MMHG | SYSTOLIC BLOOD PRESSURE: 104 MMHG

## 2024-02-19 DIAGNOSIS — O36.5990 FETAL GROWTH RESTRICTION ANTEPARTUM (HHS-HCC): ICD-10-CM

## 2024-02-19 DIAGNOSIS — O34.12 LEIOMYOMA OF UTERUS AFFECTING PREGNANCY IN SECOND TRIMESTER (HHS-HCC): ICD-10-CM

## 2024-02-19 DIAGNOSIS — Z34.82 PRENATAL CARE, SUBSEQUENT PREGNANCY IN SECOND TRIMESTER (HHS-HCC): Primary | ICD-10-CM

## 2024-02-19 DIAGNOSIS — D25.9 LEIOMYOMA OF UTERUS AFFECTING PREGNANCY IN SECOND TRIMESTER (HHS-HCC): ICD-10-CM

## 2024-02-19 DIAGNOSIS — D57.3 SICKLE CELL TRAIT IN MOTHER AFFECTING PREGNANCY (MULTI): ICD-10-CM

## 2024-02-19 DIAGNOSIS — O47.00: ICD-10-CM

## 2024-02-19 DIAGNOSIS — O99.019 SICKLE CELL TRAIT IN MOTHER AFFECTING PREGNANCY (MULTI): ICD-10-CM

## 2024-02-19 DIAGNOSIS — R82.5 POSITIVE URINE DRUG SCREEN: ICD-10-CM

## 2024-02-19 PROCEDURE — 99213 OFFICE O/P EST LOW 20 MIN: CPT | Performed by: OBSTETRICS & GYNECOLOGY

## 2024-02-21 ENCOUNTER — HOSPITAL ENCOUNTER (OUTPATIENT)
Dept: RADIOLOGY | Facility: CLINIC | Age: 29
Discharge: HOME | End: 2024-02-21
Payer: MEDICAID

## 2024-02-21 DIAGNOSIS — O36.5990 FETAL GROWTH RESTRICTION ANTEPARTUM (HHS-HCC): ICD-10-CM

## 2024-02-21 PROCEDURE — 76819 FETAL BIOPHYS PROFIL W/O NST: CPT

## 2024-02-21 PROCEDURE — 76820 UMBILICAL ARTERY ECHO: CPT

## 2024-02-21 PROCEDURE — 76819 FETAL BIOPHYS PROFIL W/O NST: CPT | Performed by: OBSTETRICS & GYNECOLOGY

## 2024-02-21 PROCEDURE — 76820 UMBILICAL ARTERY ECHO: CPT | Performed by: OBSTETRICS & GYNECOLOGY

## 2024-02-22 NOTE — PROGRESS NOTES
OB Follow up visit    ASSESSMENT & PLAN    Emilia Link is a 28 y.o.  at 38w2d presenting for routine prenatal care    Problem List Items Addressed This Visit       Prenatal care, subsequent pregnancy in second trimester - Primary    Overview     [x] Dating: LMP consistent with 9 week ultrasound  [x] Initial BMI: 19  [x] Prenatal Labs: O+, rubella immune, Hgb 11.5 (second trimester 11.0)  [x] Pap: NILM/neg HPV 2023  [x] Aneuploidy Screening:  initial cfDNA result inconclusive, repeat was risk reducing, microarray was normal  [x] Baby ASA: No  [x] Anatomy US: Normal fetal anatomy, large fibroids   [x] 1hr GCT at 24-28wks: normal at 111  [x] Tdap (27-36wks): given 23  [x] Flu Shot: Discussed, declines  [x] COVID vaccine: Discussed, declines  [x] GBS at 36 wks: collected 24 - negative  [x] Breastfeeding: Yes, planning to try  [x] PPBC: Reviewed options, desires POP  [x] 39 weeks discussion of IOL vs. Expectant management: Discussed with MFM and recommend delivery between 38-39 weeks gestation for FGR with EFW in 9%tile and AC 1%tile. Scheduled for IOL on , she will be 38+4.   [x] Mode of delivery: vaginal            Sickle cell trait in mother affecting pregnancy (CMS/HCC)    Overview     Partner has not been tested.   Genetic testing showed low risk of baby having sickle cell disease         Leiomyoma of uterus affecting pregnancy in second trimester    Overview     Large uterine fibroids, largest [id number]  Stable size  Plan for serial growth this pregnancy         Threatened  labor, antepartum    Overview     : right sided abdominal pain, cervix 80/-3 -> 1/50/-3   S/p BMZ -12/15         Fetal growth restriction antepartum    Overview     12/15: growth with EFW 1080g 12%, AC<1%  1/3/24: EFW 18%tile, AC 2%tile, normal dopplers  24: EFW 1981g 9%tile, AC <1%tile, normal dopplers  24: EFW 2492g 8%Tile, AC <1%tile, normal dopplers  Continue weekly BPPs and  dopplers   Serial growth US          Positive urine drug screen    Overview     Patient did not consent to UDS on  and ordered for unclear purpose  Patient adamantly denies any history of substance use, no opioids given at OSH, not taking home meds besides PNV and ASA, no recent exposures she is aware of    Discussed with pathologist and summary below: All confirmatory testing has been positive until 24.  23:  Screen positive fentanyl and opiates,  confirmation positive fentanyl metabolite and morphine, [facility] confirmation positive fentanyl metabolite, [place] confirmation positive for Morphine and fentanyl metabolite    24: [facility] Screen positive fentanyl, [facility] confirmation positive fentanyl metabolite, [facility] confirmation positive fentanyl metabolite    24: [facility] screen positive fentanyl,  confirmation negative fentanyl (cutoff of 2.5 ng/mL), [place] confirmation positive fentanyl (Norfentanyl at 1.3 ng/mL, cutoff 1.0 ng/mL).  This discrepancy is explained due to the fact that the  screen cutoff is 1.0 ng/mL,  confirmation cutoff is 2.5 ng/mL.  The concentration fell between the screen and our confirmatory cutoff - a level is present but below our detection limit.  The [place] test has lower cutoff, so the result was positive.  The confirmatory test is being reported as POSITIVE using the [place] result.     24: NEGATIVE             Follow up for scheduled IOL      SUBJECTIVE    HPI: Emilia Link is a 28 y.o.  at 38w2d here for RPNV. Denies contractions, bleeding, or LOF. Reports normal fetal movement. Patient without complaints today.      OBJECTIVE    Visit Vitals  /52   Wt 54.4 kg (120 lb)   LMP 2023   BMI 22.67 kg/m²   OB Status Pregnant   Smoking Status Never   BSA 1.53 m²        Tati Morrison MD

## 2024-02-24 ENCOUNTER — APPOINTMENT (OUTPATIENT)
Dept: OBSTETRICS AND GYNECOLOGY | Facility: HOSPITAL | Age: 29
End: 2024-02-24
Payer: MEDICAID

## 2024-02-24 ENCOUNTER — ANESTHESIA (OUTPATIENT)
Dept: OBSTETRICS AND GYNECOLOGY | Facility: HOSPITAL | Age: 29
End: 2024-02-24
Payer: MEDICAID

## 2024-02-24 ENCOUNTER — ANESTHESIA EVENT (OUTPATIENT)
Dept: OBSTETRICS AND GYNECOLOGY | Facility: HOSPITAL | Age: 29
End: 2024-02-24
Payer: MEDICAID

## 2024-02-24 ENCOUNTER — HOSPITAL ENCOUNTER (INPATIENT)
Facility: HOSPITAL | Age: 29
LOS: 2 days | Discharge: HOME | End: 2024-02-26
Attending: OBSTETRICS & GYNECOLOGY | Admitting: OBSTETRICS & GYNECOLOGY
Payer: MEDICAID

## 2024-02-24 DIAGNOSIS — Z30.011 ENCOUNTER FOR INITIAL PRESCRIPTION OF CONTRACEPTIVE PILLS: ICD-10-CM

## 2024-02-24 DIAGNOSIS — O36.5990 FETAL GROWTH RESTRICTION ANTEPARTUM (HHS-HCC): ICD-10-CM

## 2024-02-24 LAB
ABO GROUP (TYPE) IN BLOOD: NORMAL
ANTIBODY SCREEN: NORMAL
ERYTHROCYTE [DISTWIDTH] IN BLOOD BY AUTOMATED COUNT: 16.1 % (ref 11.5–14.5)
HCT VFR BLD AUTO: 36.4 % (ref 36–46)
HGB BLD-MCNC: 12.4 G/DL (ref 12–16)
MCH RBC QN AUTO: 29 PG (ref 26–34)
MCHC RBC AUTO-ENTMCNC: 34.1 G/DL (ref 32–36)
MCV RBC AUTO: 85 FL (ref 80–100)
NRBC BLD-RTO: 0 /100 WBCS (ref 0–0)
PLATELET # BLD AUTO: 206 X10*3/UL (ref 150–450)
RBC # BLD AUTO: 4.27 X10*6/UL (ref 4–5.2)
RH FACTOR (ANTIGEN D): NORMAL
TREPONEMA PALLIDUM IGG+IGM AB [PRESENCE] IN SERUM OR PLASMA BY IMMUNOASSAY: NONREACTIVE
WBC # BLD AUTO: 6.7 X10*3/UL (ref 4.4–11.3)

## 2024-02-24 PROCEDURE — 0KQM0ZZ REPAIR PERINEUM MUSCLE, OPEN APPROACH: ICD-10-PCS | Performed by: OBSTETRICS & GYNECOLOGY

## 2024-02-24 PROCEDURE — 01967 NEURAXL LBR ANES VAG DLVR: CPT | Performed by: STUDENT IN AN ORGANIZED HEALTH CARE EDUCATION/TRAINING PROGRAM

## 2024-02-24 PROCEDURE — 2500000004 HC RX 250 GENERAL PHARMACY W/ HCPCS (ALT 636 FOR OP/ED): Performed by: STUDENT IN AN ORGANIZED HEALTH CARE EDUCATION/TRAINING PROGRAM

## 2024-02-24 PROCEDURE — 1120000001 HC OB PRIVATE ROOM DAILY

## 2024-02-24 PROCEDURE — 2500000005 HC RX 250 GENERAL PHARMACY W/O HCPCS: Performed by: STUDENT IN AN ORGANIZED HEALTH CARE EDUCATION/TRAINING PROGRAM

## 2024-02-24 PROCEDURE — 86780 TREPONEMA PALLIDUM: CPT | Performed by: STUDENT IN AN ORGANIZED HEALTH CARE EDUCATION/TRAINING PROGRAM

## 2024-02-24 PROCEDURE — 10907ZC DRAINAGE OF AMNIOTIC FLUID, THERAPEUTIC FROM PRODUCTS OF CONCEPTION, VIA NATURAL OR ARTIFICIAL OPENING: ICD-10-PCS

## 2024-02-24 PROCEDURE — 36415 COLL VENOUS BLD VENIPUNCTURE: CPT | Performed by: STUDENT IN AN ORGANIZED HEALTH CARE EDUCATION/TRAINING PROGRAM

## 2024-02-24 PROCEDURE — 86920 COMPATIBILITY TEST SPIN: CPT

## 2024-02-24 PROCEDURE — 7210000002 HC LABOR PER HOUR

## 2024-02-24 PROCEDURE — 3E033VJ INTRODUCTION OF OTHER HORMONE INTO PERIPHERAL VEIN, PERCUTANEOUS APPROACH: ICD-10-PCS

## 2024-02-24 PROCEDURE — 59050 FETAL MONITOR W/REPORT: CPT

## 2024-02-24 PROCEDURE — 7100000016 HC LABOR RECOVERY PER HOUR

## 2024-02-24 PROCEDURE — 51701 INSERT BLADDER CATHETER: CPT

## 2024-02-24 PROCEDURE — 59409 OBSTETRICAL CARE: CPT | Performed by: OBSTETRICS & GYNECOLOGY

## 2024-02-24 PROCEDURE — 86901 BLOOD TYPING SEROLOGIC RH(D): CPT | Performed by: STUDENT IN AN ORGANIZED HEALTH CARE EDUCATION/TRAINING PROGRAM

## 2024-02-24 PROCEDURE — 85027 COMPLETE CBC AUTOMATED: CPT | Performed by: STUDENT IN AN ORGANIZED HEALTH CARE EDUCATION/TRAINING PROGRAM

## 2024-02-24 RX ORDER — LOPERAMIDE HYDROCHLORIDE 2 MG/1
4 CAPSULE ORAL EVERY 2 HOUR PRN
Status: DISCONTINUED | OUTPATIENT
Start: 2024-02-24 | End: 2024-02-24 | Stop reason: SDUPTHER

## 2024-02-24 RX ORDER — OXYTOCIN 10 [USP'U]/ML
10 INJECTION, SOLUTION INTRAMUSCULAR; INTRAVENOUS ONCE AS NEEDED
Status: DISCONTINUED | OUTPATIENT
Start: 2024-02-24 | End: 2024-02-26 | Stop reason: HOSPADM

## 2024-02-24 RX ORDER — DIPHENHYDRAMINE HYDROCHLORIDE 50 MG/ML
25 INJECTION INTRAMUSCULAR; INTRAVENOUS EVERY 6 HOURS PRN
Status: DISCONTINUED | OUTPATIENT
Start: 2024-02-24 | End: 2024-02-26 | Stop reason: HOSPADM

## 2024-02-24 RX ORDER — HYDRALAZINE HYDROCHLORIDE 20 MG/ML
5 INJECTION INTRAMUSCULAR; INTRAVENOUS ONCE AS NEEDED
Status: DISCONTINUED | OUTPATIENT
Start: 2024-02-24 | End: 2024-02-26 | Stop reason: HOSPADM

## 2024-02-24 RX ORDER — LABETALOL HYDROCHLORIDE 5 MG/ML
20 INJECTION, SOLUTION INTRAVENOUS ONCE AS NEEDED
Status: DISCONTINUED | OUTPATIENT
Start: 2024-02-24 | End: 2024-02-25 | Stop reason: HOSPADM

## 2024-02-24 RX ORDER — METHYLERGONOVINE MALEATE 0.2 MG/ML
0.2 INJECTION INTRAVENOUS ONCE AS NEEDED
Status: COMPLETED | OUTPATIENT
Start: 2024-02-24 | End: 2024-02-24

## 2024-02-24 RX ORDER — LIDOCAINE 560 MG/1
1 PATCH PERCUTANEOUS; TOPICAL; TRANSDERMAL
Status: DISCONTINUED | OUTPATIENT
Start: 2024-02-24 | End: 2024-02-26 | Stop reason: HOSPADM

## 2024-02-24 RX ORDER — NIFEDIPINE 10 MG/1
10 CAPSULE ORAL ONCE AS NEEDED
Status: DISCONTINUED | OUTPATIENT
Start: 2024-02-24 | End: 2024-02-26 | Stop reason: HOSPADM

## 2024-02-24 RX ORDER — MISOPROSTOL 200 UG/1
800 TABLET ORAL ONCE AS NEEDED
Status: DISCONTINUED | OUTPATIENT
Start: 2024-02-24 | End: 2024-02-25 | Stop reason: HOSPADM

## 2024-02-24 RX ORDER — OXYTOCIN/0.9 % SODIUM CHLORIDE 30/500 ML
60 PLASTIC BAG, INJECTION (ML) INTRAVENOUS ONCE AS NEEDED
Status: DISCONTINUED | OUTPATIENT
Start: 2024-02-24 | End: 2024-02-26 | Stop reason: HOSPADM

## 2024-02-24 RX ORDER — OXYTOCIN/0.9 % SODIUM CHLORIDE 30/500 ML
60 PLASTIC BAG, INJECTION (ML) INTRAVENOUS ONCE AS NEEDED
Status: DISCONTINUED | OUTPATIENT
Start: 2024-02-24 | End: 2024-02-25 | Stop reason: HOSPADM

## 2024-02-24 RX ORDER — CARBOPROST TROMETHAMINE 250 UG/ML
250 INJECTION, SOLUTION INTRAMUSCULAR ONCE AS NEEDED
Status: DISCONTINUED | OUTPATIENT
Start: 2024-02-24 | End: 2024-02-26 | Stop reason: HOSPADM

## 2024-02-24 RX ORDER — ONDANSETRON 4 MG/1
4 TABLET, FILM COATED ORAL EVERY 6 HOURS PRN
Status: DISCONTINUED | OUTPATIENT
Start: 2024-02-24 | End: 2024-02-26 | Stop reason: HOSPADM

## 2024-02-24 RX ORDER — POLYETHYLENE GLYCOL 3350 17 G/17G
17 POWDER, FOR SOLUTION ORAL 2 TIMES DAILY PRN
Status: DISCONTINUED | OUTPATIENT
Start: 2024-02-24 | End: 2024-02-26 | Stop reason: HOSPADM

## 2024-02-24 RX ORDER — SIMETHICONE 80 MG
80 TABLET,CHEWABLE ORAL 4 TIMES DAILY PRN
Status: DISCONTINUED | OUTPATIENT
Start: 2024-02-24 | End: 2024-02-26 | Stop reason: HOSPADM

## 2024-02-24 RX ORDER — TRANEXAMIC ACID 100 MG/ML
1000 INJECTION, SOLUTION INTRAVENOUS ONCE AS NEEDED
Status: DISCONTINUED | OUTPATIENT
Start: 2024-02-24 | End: 2024-02-26 | Stop reason: HOSPADM

## 2024-02-24 RX ORDER — NORETHINDRONE AND ETHINYL ESTRADIOL 0.5-0.035
KIT ORAL AS NEEDED
Status: DISCONTINUED | OUTPATIENT
Start: 2024-02-24 | End: 2024-02-24

## 2024-02-24 RX ORDER — FENTANYL/BUPIVACAINE/NS/PF 2MCG/ML-.1
PLASTIC BAG, INJECTION (ML) INJECTION CONTINUOUS PRN
Status: DISCONTINUED | OUTPATIENT
Start: 2024-02-24 | End: 2024-02-24

## 2024-02-24 RX ORDER — ONDANSETRON HYDROCHLORIDE 2 MG/ML
4 INJECTION, SOLUTION INTRAVENOUS EVERY 6 HOURS PRN
Status: DISCONTINUED | OUTPATIENT
Start: 2024-02-24 | End: 2024-02-24 | Stop reason: SDUPTHER

## 2024-02-24 RX ORDER — ACETAMINOPHEN 325 MG/1
975 TABLET ORAL EVERY 6 HOURS
Status: DISCONTINUED | OUTPATIENT
Start: 2024-02-25 | End: 2024-02-26 | Stop reason: HOSPADM

## 2024-02-24 RX ORDER — LABETALOL HYDROCHLORIDE 5 MG/ML
20 INJECTION, SOLUTION INTRAVENOUS ONCE AS NEEDED
Status: DISCONTINUED | OUTPATIENT
Start: 2024-02-24 | End: 2024-02-26 | Stop reason: HOSPADM

## 2024-02-24 RX ORDER — BISACODYL 10 MG/1
10 SUPPOSITORY RECTAL DAILY PRN
Status: DISCONTINUED | OUTPATIENT
Start: 2024-02-24 | End: 2024-02-26 | Stop reason: HOSPADM

## 2024-02-24 RX ORDER — SODIUM CHLORIDE, SODIUM LACTATE, POTASSIUM CHLORIDE, CALCIUM CHLORIDE 600; 310; 30; 20 MG/100ML; MG/100ML; MG/100ML; MG/100ML
125 INJECTION, SOLUTION INTRAVENOUS CONTINUOUS
Status: DISCONTINUED | OUTPATIENT
Start: 2024-02-24 | End: 2024-02-26 | Stop reason: HOSPADM

## 2024-02-24 RX ORDER — ONDANSETRON HYDROCHLORIDE 2 MG/ML
4 INJECTION, SOLUTION INTRAVENOUS EVERY 6 HOURS PRN
Status: DISCONTINUED | OUTPATIENT
Start: 2024-02-24 | End: 2024-02-26 | Stop reason: HOSPADM

## 2024-02-24 RX ORDER — OXYTOCIN/0.9 % SODIUM CHLORIDE 30/500 ML
2-30 PLASTIC BAG, INJECTION (ML) INTRAVENOUS CONTINUOUS
Status: DISCONTINUED | OUTPATIENT
Start: 2024-02-24 | End: 2024-02-26 | Stop reason: HOSPADM

## 2024-02-24 RX ORDER — OXYTOCIN 10 [USP'U]/ML
10 INJECTION, SOLUTION INTRAMUSCULAR; INTRAVENOUS ONCE AS NEEDED
Status: DISCONTINUED | OUTPATIENT
Start: 2024-02-24 | End: 2024-02-25 | Stop reason: HOSPADM

## 2024-02-24 RX ORDER — METHYLERGONOVINE MALEATE 0.2 MG/ML
0.2 INJECTION INTRAVENOUS ONCE AS NEEDED
Status: DISCONTINUED | OUTPATIENT
Start: 2024-02-24 | End: 2024-02-26 | Stop reason: HOSPADM

## 2024-02-24 RX ORDER — TRANEXAMIC ACID 100 MG/ML
1000 INJECTION, SOLUTION INTRAVENOUS ONCE AS NEEDED
Status: DISCONTINUED | OUTPATIENT
Start: 2024-02-24 | End: 2024-02-24 | Stop reason: SDUPTHER

## 2024-02-24 RX ORDER — LOPERAMIDE HYDROCHLORIDE 2 MG/1
4 CAPSULE ORAL EVERY 2 HOUR PRN
Status: DISCONTINUED | OUTPATIENT
Start: 2024-02-24 | End: 2024-02-26 | Stop reason: HOSPADM

## 2024-02-24 RX ORDER — MISOPROSTOL 200 UG/1
800 TABLET ORAL ONCE AS NEEDED
Status: DISCONTINUED | OUTPATIENT
Start: 2024-02-24 | End: 2024-02-26 | Stop reason: HOSPADM

## 2024-02-24 RX ORDER — CARBOPROST TROMETHAMINE 250 UG/ML
250 INJECTION, SOLUTION INTRAMUSCULAR ONCE AS NEEDED
Status: DISCONTINUED | OUTPATIENT
Start: 2024-02-24 | End: 2024-02-25 | Stop reason: HOSPADM

## 2024-02-24 RX ORDER — PHENYLEPHRINE HCL IN 0.9% NACL 0.4MG/10ML
SYRINGE (ML) INTRAVENOUS AS NEEDED
Status: DISCONTINUED | OUTPATIENT
Start: 2024-02-24 | End: 2024-02-24

## 2024-02-24 RX ORDER — HYDRALAZINE HYDROCHLORIDE 20 MG/ML
5 INJECTION INTRAMUSCULAR; INTRAVENOUS ONCE AS NEEDED
Status: DISCONTINUED | OUTPATIENT
Start: 2024-02-24 | End: 2024-02-25 | Stop reason: HOSPADM

## 2024-02-24 RX ORDER — LIDOCAINE HYDROCHLORIDE 10 MG/ML
30 INJECTION INFILTRATION; PERINEURAL ONCE AS NEEDED
Status: DISCONTINUED | OUTPATIENT
Start: 2024-02-24 | End: 2024-02-25 | Stop reason: HOSPADM

## 2024-02-24 RX ORDER — METOCLOPRAMIDE 10 MG/1
10 TABLET ORAL EVERY 6 HOURS PRN
Status: DISCONTINUED | OUTPATIENT
Start: 2024-02-24 | End: 2024-02-26 | Stop reason: HOSPADM

## 2024-02-24 RX ORDER — NIFEDIPINE 10 MG/1
10 CAPSULE ORAL ONCE AS NEEDED
Status: DISCONTINUED | OUTPATIENT
Start: 2024-02-24 | End: 2024-02-25 | Stop reason: HOSPADM

## 2024-02-24 RX ORDER — ADHESIVE BANDAGE
10 BANDAGE TOPICAL
Status: DISCONTINUED | OUTPATIENT
Start: 2024-02-24 | End: 2024-02-26 | Stop reason: HOSPADM

## 2024-02-24 RX ORDER — ONDANSETRON 4 MG/1
4 TABLET, FILM COATED ORAL EVERY 6 HOURS PRN
Status: DISCONTINUED | OUTPATIENT
Start: 2024-02-24 | End: 2024-02-24 | Stop reason: SDUPTHER

## 2024-02-24 RX ORDER — DIPHENHYDRAMINE HCL 25 MG
25 CAPSULE ORAL EVERY 6 HOURS PRN
Status: DISCONTINUED | OUTPATIENT
Start: 2024-02-24 | End: 2024-02-26 | Stop reason: HOSPADM

## 2024-02-24 RX ORDER — FENTANYL/BUPIVACAINE/NS/PF 2MCG/ML-.1
PLASTIC BAG, INJECTION (ML) INJECTION AS NEEDED
Status: DISCONTINUED | OUTPATIENT
Start: 2024-02-24 | End: 2024-02-24

## 2024-02-24 RX ORDER — TERBUTALINE SULFATE 1 MG/ML
0.25 INJECTION SUBCUTANEOUS ONCE AS NEEDED
Status: DISCONTINUED | OUTPATIENT
Start: 2024-02-24 | End: 2024-02-25 | Stop reason: HOSPADM

## 2024-02-24 RX ORDER — LIDOCAINE HCL/EPINEPHRINE/PF 2%-1:200K
VIAL (ML) INJECTION AS NEEDED
Status: DISCONTINUED | OUTPATIENT
Start: 2024-02-24 | End: 2024-02-24

## 2024-02-24 RX ORDER — METOCLOPRAMIDE HYDROCHLORIDE 5 MG/ML
10 INJECTION INTRAMUSCULAR; INTRAVENOUS EVERY 6 HOURS PRN
Status: DISCONTINUED | OUTPATIENT
Start: 2024-02-24 | End: 2024-02-26 | Stop reason: HOSPADM

## 2024-02-24 RX ADMIN — Medication 5 ML: at 14:33

## 2024-02-24 RX ADMIN — METHYLERGONOVINE MALEATE 0.2 MG: 0.2 INJECTION, SOLUTION INTRAMUSCULAR; INTRAVENOUS at 21:49

## 2024-02-24 RX ADMIN — Medication 2 MILLI-UNITS/MIN: at 12:58

## 2024-02-24 RX ADMIN — SODIUM CHLORIDE, POTASSIUM CHLORIDE, SODIUM LACTATE AND CALCIUM CHLORIDE 125 ML/HR: 600; 310; 30; 20 INJECTION, SOLUTION INTRAVENOUS at 09:18

## 2024-02-24 RX ADMIN — Medication 14 ML/HR: at 14:35

## 2024-02-24 RX ADMIN — ONDANSETRON 4 MG: 2 INJECTION INTRAMUSCULAR; INTRAVENOUS at 13:55

## 2024-02-24 RX ADMIN — EPHEDRINE SULFATE 25 MG: 50 INJECTION, SOLUTION INTRAVENOUS at 16:27

## 2024-02-24 RX ADMIN — SODIUM CHLORIDE, SODIUM LACTATE, POTASSIUM CHLORIDE, AND CALCIUM CHLORIDE: 600; 310; 30; 20 INJECTION, SOLUTION INTRAVENOUS at 16:22

## 2024-02-24 RX ADMIN — Medication 3 ML: at 14:34

## 2024-02-24 RX ADMIN — LIDOCAINE HYDROCHLORIDE,EPINEPHRINE BITARTRATE 3 ML: 20; .005 INJECTION, SOLUTION EPIDURAL; INFILTRATION; INTRACAUDAL; PERINEURAL at 14:32

## 2024-02-24 RX ADMIN — Medication 80 MCG: at 16:20

## 2024-02-24 RX ADMIN — SODIUM CHLORIDE, POTASSIUM CHLORIDE, SODIUM LACTATE AND CALCIUM CHLORIDE 125 ML/HR: 600; 310; 30; 20 INJECTION, SOLUTION INTRAVENOUS at 13:02

## 2024-02-24 SDOH — SOCIAL STABILITY: SOCIAL INSECURITY: VERBAL ABUSE: DENIES

## 2024-02-24 SDOH — HEALTH STABILITY: MENTAL HEALTH: HOW OFTEN DO YOU HAVE 6 OR MORE DRINKS ON ONE OCCASION?: NEVER

## 2024-02-24 SDOH — SOCIAL STABILITY: SOCIAL INSECURITY: WITHIN THE LAST YEAR, HAVE YOU BEEN HUMILIATED OR EMOTIONALLY ABUSED IN OTHER WAYS BY YOUR PARTNER OR EX-PARTNER?: NO

## 2024-02-24 SDOH — HEALTH STABILITY: MENTAL HEALTH: HOW OFTEN DO YOU HAVE A DRINK CONTAINING ALCOHOL?: NEVER

## 2024-02-24 SDOH — ECONOMIC STABILITY: TRANSPORTATION INSECURITY
IN THE PAST 12 MONTHS, HAS LACK OF TRANSPORTATION KEPT YOU FROM MEETINGS, WORK, OR FROM GETTING THINGS NEEDED FOR DAILY LIVING?: NO

## 2024-02-24 SDOH — SOCIAL STABILITY: SOCIAL INSECURITY
WITHIN THE LAST YEAR, HAVE TO BEEN RAPED OR FORCED TO HAVE ANY KIND OF SEXUAL ACTIVITY BY YOUR PARTNER OR EX-PARTNER?: NO

## 2024-02-24 SDOH — SOCIAL STABILITY: SOCIAL INSECURITY: DOES ANYONE TRY TO KEEP YOU FROM HAVING/CONTACTING OTHER FRIENDS OR DOING THINGS OUTSIDE YOUR HOME?: NO

## 2024-02-24 SDOH — ECONOMIC STABILITY: FOOD INSECURITY: WITHIN THE PAST 12 MONTHS, YOU WORRIED THAT YOUR FOOD WOULD RUN OUT BEFORE YOU GOT MONEY TO BUY MORE.: NEVER TRUE

## 2024-02-24 SDOH — SOCIAL STABILITY: SOCIAL INSECURITY: HAS ANYONE EVER THREATENED TO HURT YOUR FAMILY OR YOUR PETS?: NO

## 2024-02-24 SDOH — SOCIAL STABILITY: SOCIAL INSECURITY: ARE THERE ANY APPARENT SIGNS OF INJURIES/BEHAVIORS THAT COULD BE RELATED TO ABUSE/NEGLECT?: NO

## 2024-02-24 SDOH — HEALTH STABILITY: MENTAL HEALTH: CURRENT SMOKER: 0

## 2024-02-24 SDOH — ECONOMIC STABILITY: INCOME INSECURITY: HOW HARD IS IT FOR YOU TO PAY FOR THE VERY BASICS LIKE FOOD, HOUSING, MEDICAL CARE, AND HEATING?: NOT HARD AT ALL

## 2024-02-24 SDOH — ECONOMIC STABILITY: FOOD INSECURITY: WITHIN THE PAST 12 MONTHS, THE FOOD YOU BOUGHT JUST DIDN'T LAST AND YOU DIDN'T HAVE MONEY TO GET MORE.: NEVER TRUE

## 2024-02-24 SDOH — HEALTH STABILITY: MENTAL HEALTH: SUICIDAL BEHAVIOR (LIFETIME): NO

## 2024-02-24 SDOH — SOCIAL STABILITY: SOCIAL INSECURITY: DO YOU FEEL ANYONE HAS EXPLOITED OR TAKEN ADVANTAGE OF YOU FINANCIALLY OR OF YOUR PERSONAL PROPERTY?: NO

## 2024-02-24 SDOH — ECONOMIC STABILITY: TRANSPORTATION INSECURITY
IN THE PAST 12 MONTHS, HAS THE LACK OF TRANSPORTATION KEPT YOU FROM MEDICAL APPOINTMENTS OR FROM GETTING MEDICATIONS?: NO

## 2024-02-24 SDOH — SOCIAL STABILITY: SOCIAL INSECURITY
WITHIN THE LAST YEAR, HAVE YOU BEEN KICKED, HIT, SLAPPED, OR OTHERWISE PHYSICALLY HURT BY YOUR PARTNER OR EX-PARTNER?: NO

## 2024-02-24 SDOH — HEALTH STABILITY: MENTAL HEALTH: HAVE YOU USED ANY PRESCRIPTION DRUGS OTHER THAN PRESCRIBED IN THE PAST 12 MONTHS?: NO

## 2024-02-24 SDOH — SOCIAL STABILITY: SOCIAL INSECURITY: ABUSE SCREEN: ADULT

## 2024-02-24 SDOH — SOCIAL STABILITY: SOCIAL INSECURITY: WITHIN THE LAST YEAR, HAVE YOU BEEN AFRAID OF YOUR PARTNER OR EX-PARTNER?: NO

## 2024-02-24 SDOH — ECONOMIC STABILITY: HOUSING INSECURITY: DO YOU FEEL UNSAFE GOING BACK TO THE PLACE WHERE YOU ARE LIVING?: NO

## 2024-02-24 SDOH — HEALTH STABILITY: MENTAL HEALTH: NON-SPECIFIC ACTIVE SUICIDAL THOUGHTS (PAST 1 MONTH): NO

## 2024-02-24 SDOH — HEALTH STABILITY: MENTAL HEALTH: HAVE YOU USED ANY SUBSTANCES (CANABIS, COCAINE, HEROIN, HALLUCINOGENS, INHALANTS, ETC.) IN THE PAST 12 MONTHS?: NO

## 2024-02-24 SDOH — SOCIAL STABILITY: SOCIAL INSECURITY: HAVE YOU HAD THOUGHTS OF HARMING ANYONE ELSE?: NO

## 2024-02-24 SDOH — HEALTH STABILITY: MENTAL HEALTH: WERE YOU ABLE TO COMPLETE ALL THE BEHAVIORAL HEALTH SCREENINGS?: YES

## 2024-02-24 SDOH — HEALTH STABILITY: MENTAL HEALTH: WISH TO BE DEAD (PAST 1 MONTH): NO

## 2024-02-24 SDOH — SOCIAL STABILITY: SOCIAL INSECURITY: ARE YOU OR HAVE YOU BEEN THREATENED OR ABUSED PHYSICALLY, EMOTIONALLY, OR SEXUALLY BY ANYONE?: NO

## 2024-02-24 SDOH — SOCIAL STABILITY: SOCIAL INSECURITY: PHYSICAL ABUSE: DENIES

## 2024-02-24 ASSESSMENT — LIFESTYLE VARIABLES
AUDIT-C TOTAL SCORE: 0
HOW MANY STANDARD DRINKS CONTAINING ALCOHOL DO YOU HAVE ON A TYPICAL DAY: PATIENT DOES NOT DRINK
HOW OFTEN DO YOU HAVE A DRINK CONTAINING ALCOHOL: NEVER
AUDIT-C TOTAL SCORE: 0
HOW OFTEN DO YOU HAVE 6 OR MORE DRINKS ON ONE OCCASION: NEVER
SKIP TO QUESTIONS 9-10: 1

## 2024-02-24 ASSESSMENT — PAIN SCALES - GENERAL

## 2024-02-24 ASSESSMENT — PAIN - FUNCTIONAL ASSESSMENT
PAIN_FUNCTIONAL_ASSESSMENT: 0-10
PAIN_FUNCTIONAL_ASSESSMENT: 0-10

## 2024-02-24 ASSESSMENT — PATIENT HEALTH QUESTIONNAIRE - PHQ9
SUM OF ALL RESPONSES TO PHQ9 QUESTIONS 1 & 2: 0
1. LITTLE INTEREST OR PLEASURE IN DOING THINGS: NOT AT ALL
2. FEELING DOWN, DEPRESSED OR HOPELESS: NOT AT ALL

## 2024-02-24 ASSESSMENT — ACTIVITIES OF DAILY LIVING (ADL): LACK_OF_TRANSPORTATION: NO

## 2024-02-24 NOTE — ANESTHESIA PREPROCEDURE EVALUATION
Patient: Emilia Link    Evaluation Method: In-person visit    Procedure Information    Date: 02/24/24  Procedure: Labor Analgesia         Relevant Problems   Anesthesia (within normal limits)      Cardiovascular (within normal limits)      Endocrine (within normal limits)      GI (within normal limits)      /Renal (within normal limits)      Neuro/Psych (within normal limits)      Pulmonary (within normal limits)      GI/Hepatic (within normal limits)      Hematology (within normal limits)       Clinical information reviewed:   Tobacco  Allergies  Meds   Med Hx  Surg Hx   Fam Hx  Soc Hx        NPO Detail:  No data recorded     OB/Gyn Evaluation    Present Pregnancy    Patient is pregnant now.   Obstetric History            Physical Exam    Airway  Mallampati: III  TM distance: >3 FB  Neck ROM: full     Cardiovascular   Rhythm: regular  Rate: normal     Dental    Pulmonary   Breath sounds clear to auscultation     Abdominal          Anesthesia Plan    History of general anesthesia?: yes  History of complications of general anesthesia?: no    ASA 2     epidural     The patient is not a current smoker.  Patient was not previously instructed to abstain from smoking on day of procedure.  Patient did not smoke on day of procedure.    Anesthetic plan and risks discussed with patient.    Plan discussed with resident.

## 2024-02-24 NOTE — PROGRESS NOTES
Labor check:    Patient very uncomfortable with contractions.   Cervix examined and 4/80/-2.  Tracing category 1  Pitocin turned off due to contractions q 1-2 min  Will restart pitocin if contractions space out

## 2024-02-24 NOTE — H&P
Obstetrical Admission History and Physical     Emilia Link is a 28 y.o.  at 38w4d. VAIBHAV: 3/5/2024, by Last Menstrual Period. Estimated fetal weight: 2492g (US ). She has had prenatal care with Dr. Morrison .    Chief Complaint: Scheduled Induction    Assessment/Plan    IOL  - Admit, consent, scan - cephalic  - For pitocin given favorable cervix and AROM when appropriate   - Epidural per patient preference     Maternal wellbeing  - Right lateral wall fibroid 10 x 8 x 6cm, T&C 1U pRBC     Fetal wellbeing  - Cat I, CEFM     Postpartum  - Plans to breastfeed  - ppBC: POPs     D/w Dr. Prince Wall MD     Principal Problem:    Fetal growth restriction antepartum      Pregnancy Problems (from 23 to present)       Problem Noted Resolved    Fetal growth restriction antepartum 12/15/2023 by Katy Morejon MD No    Priority:  Medium      Overview Addendum 2024 10:24 AM by Tati Morrison MD     12/15: growth with EFW 1080g 12%, AC<1%  1/3/24: EFW 18%tile, AC 2%tile, normal dopplers  24: EFW 1981g 9%tile, AC <1%tile, normal dopplers  24: EFW 2492g 8%Tile, AC <1%tile, normal dopplers  Continue weekly BPPs and dopplers   Serial growth US          Threatened  labor, antepartum 2023 by Adrien Lucero MD No    Priority:  Medium      Overview Signed 12/15/2023 12:03 PM by Katy Morejon MD     : right sided abdominal pain, cervix 1/80/-3 -> 1/50/-3   S/p BMZ -12/15         Leiomyoma of uterus affecting pregnancy in second trimester 2023 by Tati Morrison MD No    Priority:  Medium      Overview Addendum 2024 12:23 PM by Tati Morrison MD     Large uterine fibroids, largest [id number]  Stable size  Plan for serial growth this pregnancy         Prenatal care, subsequent pregnancy in second trimester 10/23/2023 by ASAF Patel-CNM, APRN-CNP No    Priority:  Medium      Overview Addendum 2024 10:01 AM by Tati ZHAO  MD Prince     [x] Dating: LMP consistent with 9 week ultrasound  [x] Initial BMI: 19  [x] Prenatal Labs: O+, rubella immune, Hgb 11.5 (second trimester 11.0)  [x] Pap: NILM/neg HPV 7/2023  [x] Aneuploidy Screening:  initial cfDNA result inconclusive, repeat was risk reducing, microarray was normal  [x] Baby ASA: No  [x] Anatomy US: Normal fetal anatomy, large fibroids   [x] 1hr GCT at 24-28wks: normal at 111  [x] Tdap (27-36wks): given 12/18/23  [x] Flu Shot: Discussed, declines  [x] COVID vaccine: Discussed, declines  [x] GBS at 36 wks: collected 2/5/24 - negative  [x] Breastfeeding: Yes, planning to try  [x] PPBC: Reviewed options, desires POP  [x] 39 weeks discussion of IOL vs. Expectant management: Discussed with MFM and recommend delivery between 38-39 weeks gestation for FGR with EFW in 9%tile and AC 1%tile. Scheduled for IOL on 2/24, she will be 38+4.   [x] Mode of delivery: vaginal            Sickle cell trait in mother affecting pregnancy (CMS/HCC) 10/23/2023 by Estella Oliva, APRN-CNM, APRN-CNP No    Priority:  Medium      Overview Addendum 2/5/2024  1:10 PM by Tati Morrison MD     Partner has not been tested.   Genetic testing showed low risk of baby having sickle cell disease               Options for delivery have been discussed with the patient and she elects for an induction of labor.  Cervical ripening with cytotec, cervidil, other prostaglandin agents has been discussed.  Induction of labor with pitocin, amniotomy, cytotec, and cervical balloon have been discussed in detail. The risks, benefits, complications, alternatives, expected outcomes, potential problems during recuperation and recovery, and the risks of not performing the procedure were discussed with the patient. The patient stated understanding that the risks of delivery include, but are not limited to: death; reaction to medications; injury to bowel, bladder, ureters, uterus, cervix, vagina, and other pelvic and abdominal  structures, infection; blood loss and possible need for transfusion; and potential need for surgery, including hysterectomy. The risks of injury to the infant during delivery were also discussed. All questions were answered. There was concurrence with the planned procedure, and the patient wanted to proceed.    Admit to inpatient status. I anticipate that this patient will require a stay exceeding at least 2 midnights for delivery and postpartum.  Induction of labor.  Management of pregnancy complications, as indicated.    Subjective   Good fetal movement. Denies vaginal bleeding., Denies contractions., Denies leaking of fluid.       Reason for Induction of Labor:  Fetal growth restriction     Obstetrical History   OB History    Para Term  AB Living   2       1 0   SAB IAB Ectopic Multiple Live Births           0      # Outcome Date GA Lbr Jacky/2nd Weight Sex Delivery Anes PTL Lv   2 Current            1 AB 2012 4w0d            Past Medical History  Past Medical History:   Diagnosis Date    Other specified health status     No pertinent past medical history    Personal history of other diseases of the female genital tract 2014    History of vaginitis    Personal history of other diseases of the female genital tract 2014    Personal history of amenorrhea    Personal history of other diseases of the female genital tract 2014    History of vaginitis    Sickle cell anemia (CMS/[facility])       Past Surgical History   History reviewed. No pertinent surgical history.    Social History  Social History     Tobacco Use    Smoking status: Never     Passive exposure: Never    Smokeless tobacco: Never   Substance Use Topics    Alcohol use: Not Currently     Substance and Sexual Activity   Drug Use Never     Allergies  Motrin [ibuprofen]     Medications  Medications Prior to Admission   Medication Sig Dispense Refill Last Dose    aspirin 81 mg EC tablet Take 2 tablets (162 mg) by mouth once daily.        prenatal no115/iron/folic acid (PRENATAL 19 ORAL) Take by mouth.       prenatal vitamin, iron-folic, 27 mg iron-800 mcg folic acid tablet Take 1 tablet by mouth once daily. 90 tablet 3      Objective    Last Vitals  Temp Pulse Resp BP MAP O2 Sat     87   109/76         Physical Examination  GENERAL: well developed, well nourished female in no acute distress  HEENT: clear sclera  NECK: supple  LUNGS: breathing comfortably on room air  HEART: warm and well-perfused  SKIN: no rashes or lesions  NEUROLOGICAL: grossly intact bilaterally  PSYCHOLOGICAL: appropriate affect      Cervical Exam  Dilation: 3  Effacement (%): 50  Fetal Station: -2  OB Examiner: ASHISH Morrison

## 2024-02-24 NOTE — ANESTHESIA PROCEDURE NOTES
Epidural Block    Patient location during procedure: OB  Start time: 2/24/2024 2:21 PM  End time: 2/24/2024 2:41 PM  Reason for block: labor analgesia  Staffing  Performed: resident   Authorized by: Vishal Narayan MD    Performed by: Vishal Narayan MD    Preanesthetic Checklist  Completed: patient identified, IV checked, risks and benefits discussed, surgical consent, pre-op evaluation, timeout performed and sterile techniques followed  Block Timeout  RN/Licensed healthcare professional reads aloud to the Anesthesia provider and entire team: Patient identity, procedure with side and site, patient position, and as applicable the availability of implants/special equipment/special requirements.  Patient on coagulant treatment: no  Timeout performed at: 2/24/2024 2:22 PM  Block Placement  Patient position: sitting  Prep: ChloraPrep  Sterility prep: cap, drape, gloves, hand and mask  Sedation level: no sedation  Patient monitoring: blood pressure, continuous pulse oximetry and heart rate  Approach: midline  Local numbing: lidocaine 1% to skin and subcutaneous tissues  Vertebral space: lumbar  Lumbar location: L4-L5  Epidural  Loss of resistance technique: saline  Guidance: landmark technique        Needle  Needle type: Tuohy   Needle gauge: 17  Needle length: 9 cm  Needle insertion depth: 5 cm  Catheter type: multi-orifice  Catheter at skin depth: 10 cm  Catheter securement method: clear occlusive dressing    Test dose: lidocaine 1.5% with epinephrine 1-to-200,000  Test dose: lidocaine 1.5% with epinephrine 1-to-200,000  Test dose result: no positive test dose    PCEA  Medication concentration used: 0.044% Bupivacaine with 1.25 mcg/mL Fentanyl and 1:882010 Epinephrine  Dose (mL): 160  Lockout (minutes): 15  1-Hour Limit (boluses/hr): 4  Basal Rate: 14        Assessment  Sensory level: T10 bilateral  Block outcome: patient comfortable  Number of attempts: 1  Events: no positive test dose  Procedure  assessment: patient tolerated procedure well with no immediate complications

## 2024-02-25 ENCOUNTER — DOCUMENTATION (OUTPATIENT)
Dept: PEDIATRIC HEMATOLOGY/ONCOLOGY | Facility: HOSPITAL | Age: 29
End: 2024-02-25
Payer: MEDICAID

## 2024-02-25 LAB
AMPHETAMINES UR QL SCN: ABNORMAL
BARBITURATES UR QL SCN: ABNORMAL
BENZODIAZ UR QL SCN: ABNORMAL
BZE UR QL SCN: ABNORMAL
CANNABINOIDS UR QL SCN: ABNORMAL
ERYTHROCYTE [DISTWIDTH] IN BLOOD BY AUTOMATED COUNT: 16 % (ref 11.5–14.5)
FENTANYL+NORFENTANYL UR QL SCN: ABNORMAL
HCT VFR BLD AUTO: 34.2 % (ref 36–46)
HGB BLD-MCNC: 11.9 G/DL (ref 12–16)
MCH RBC QN AUTO: 29.5 PG (ref 26–34)
MCHC RBC AUTO-ENTMCNC: 34.8 G/DL (ref 32–36)
MCV RBC AUTO: 85 FL (ref 80–100)
NRBC BLD-RTO: 0 /100 WBCS (ref 0–0)
OPIATES UR QL SCN: ABNORMAL
OXYCODONE+OXYMORPHONE UR QL SCN: ABNORMAL
PCP UR QL SCN: ABNORMAL
PLATELET # BLD AUTO: 186 X10*3/UL (ref 150–450)
RBC # BLD AUTO: 4.03 X10*6/UL (ref 4–5.2)
WBC # BLD AUTO: 12.3 X10*3/UL (ref 4.4–11.3)

## 2024-02-25 PROCEDURE — 2500000005 HC RX 250 GENERAL PHARMACY W/O HCPCS: Performed by: STUDENT IN AN ORGANIZED HEALTH CARE EDUCATION/TRAINING PROGRAM

## 2024-02-25 PROCEDURE — 1100000001 HC PRIVATE ROOM DAILY

## 2024-02-25 PROCEDURE — 88307 TISSUE EXAM BY PATHOLOGIST: CPT | Performed by: PATHOLOGY

## 2024-02-25 PROCEDURE — 7100000016 HC LABOR RECOVERY PER HOUR

## 2024-02-25 PROCEDURE — 2500000001 HC RX 250 WO HCPCS SELF ADMINISTERED DRUGS (ALT 637 FOR MEDICARE OP)

## 2024-02-25 PROCEDURE — 2500000001 HC RX 250 WO HCPCS SELF ADMINISTERED DRUGS (ALT 637 FOR MEDICARE OP): Performed by: STUDENT IN AN ORGANIZED HEALTH CARE EDUCATION/TRAINING PROGRAM

## 2024-02-25 PROCEDURE — 80307 DRUG TEST PRSMV CHEM ANLYZR: CPT

## 2024-02-25 PROCEDURE — 85027 COMPLETE CBC AUTOMATED: CPT | Performed by: STUDENT IN AN ORGANIZED HEALTH CARE EDUCATION/TRAINING PROGRAM

## 2024-02-25 PROCEDURE — 2500000004 HC RX 250 GENERAL PHARMACY W/ HCPCS (ALT 636 FOR OP/ED): Performed by: STUDENT IN AN ORGANIZED HEALTH CARE EDUCATION/TRAINING PROGRAM

## 2024-02-25 PROCEDURE — 88307 TISSUE EXAM BY PATHOLOGIST: CPT | Mod: TC,SUR | Performed by: STUDENT IN AN ORGANIZED HEALTH CARE EDUCATION/TRAINING PROGRAM

## 2024-02-25 PROCEDURE — 1120000001 HC OB PRIVATE ROOM DAILY

## 2024-02-25 RX ORDER — CYCLOBENZAPRINE HCL 10 MG
5 TABLET ORAL 3 TIMES DAILY PRN
Status: DISCONTINUED | OUTPATIENT
Start: 2024-02-25 | End: 2024-02-26 | Stop reason: HOSPADM

## 2024-02-25 RX ADMIN — CYCLOBENZAPRINE 5 MG: 10 TABLET, FILM COATED ORAL at 18:18

## 2024-02-25 RX ADMIN — BENZOCAINE AND LEVOMENTHOL 1 APPLICATION: 200; 5 SPRAY TOPICAL at 00:57

## 2024-02-25 RX ADMIN — ACETAMINOPHEN 975 MG: 325 TABLET ORAL at 06:42

## 2024-02-25 RX ADMIN — ACETAMINOPHEN 975 MG: 325 TABLET ORAL at 12:39

## 2024-02-25 RX ADMIN — POLYETHYLENE GLYCOL 3350 17 G: 17 POWDER, FOR SOLUTION ORAL at 12:39

## 2024-02-25 RX ADMIN — ACETAMINOPHEN 975 MG: 325 TABLET ORAL at 18:18

## 2024-02-25 RX ADMIN — WITCH HAZEL 1 EACH: 5 CLOTH TOPICAL at 00:56

## 2024-02-25 RX ADMIN — ACETAMINOPHEN 975 MG: 325 TABLET ORAL at 00:13

## 2024-02-25 ASSESSMENT — PAIN SCALES - GENERAL
PAINLEVEL_OUTOF10: 6
PAINLEVEL_OUTOF10: 5 - MODERATE PAIN
PAINLEVEL_OUTOF10: 0 - NO PAIN

## 2024-02-25 ASSESSMENT — PAIN DESCRIPTION - LOCATION: LOCATION: VAGINA

## 2024-02-25 NOTE — PROGRESS NOTES
Social Work Consult due to positive tox for Fentanyl:    Patient in due to delivery of infant on 02.24.24  Patient tested positive for the drug 4 times over past few months after a history of negative tests. Patient reported to everyone, including OB, nursing, etc that she has never used fentanyl and there is no way that she should be positive for the substance.     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 Returned to [place] to be with her maternal grandmother who was living alone. Patient reports wanting to buy her own home but assisting MG in clearing and rehabbing the family home which housed 4 generations over time.     In the lab reports there is mention of unintentional poisoning by fentanyl... Also mentioned Fentanyl metabolite, consistent with use of drug containing fentanyl...     This possibility led to my discussion with the nurse who mentioned epidural possibly being a cause, but could not confirm either way.   In discussion with patient she reports not knowing how it could have happened.   1. Acute signs of early labor at work while working with patients. Caused the need for transport to [facility]. Can't recall if it was possibly exposure at that time by way of a patient and residue exposure.   2. No new friends in social [place] or none in/out of home.   3. FOB currently not actively involved nor is he a drug user so couldn't have come from him.  4. 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 Doesn't know if that type of neutral access caused something.     Patient lost her mom and MGGM in the same year. Was able to relocate and has maintained employment for at least 7 years or more. Moved back from [place] due to not having roommates any longer and high cost of  living.  Necessitated the returned to [place] to help her MGM,  while preparing to buy a home. Patient has supportive friends and a lot of family, has provisions for the infant and support from employer. Patient a bit tearful due to current situation of tox screens and reports that she asked for repeated screens until tox was clear. That's why there were many back to back still pending results from the previous. OB continued the screens until they were negative.   No signs of depression, no talk or thoughts of suicide, no signs of anxiety, no plans or thoughts of hurting others, doesn't isolate, response to current situation appropriate, expected and usual. Patient exhibited appropriate responses to staff and the baby. Asking appropriate questions, initial bonding appears to be appropriate as well     Due to the circumstantial points from the self reported interview; longevity with employment, support and connection with [facility], appropriate start of prenatal care, interaction, affect & nonverbal cues with this worker during interview, interaction with staff, interaction with infant, patient appeared appropriate and able to be discharged without CFS intervention.      Discussed with patient the severity of situation, especially if the drug wasn't intentional. Not knowing how residue contact was made could put her at further risk. This worker asked for another baseline test, patient was in agreement. Patient was informed that any future positive tox would initiate a call to CFS for investigation. Patient mentioned that she would welcome someone coming in and trying to figure out how she was exposed. This worker suggested [facility] might be a good start.     Sheela Fritz with questions or concerns

## 2024-02-25 NOTE — L&D DELIVERY NOTE
OB Delivery Note  2024  Emilia Link  28 y.o.   Vaginal, Spontaneous        Gestational Age: 38w4d  /Para:   Quantitative Blood Loss: Admission to Discharge: 500 mL (2024  8:05 AM - 2024 11:04 PM)    Suraj Link [[id number]]      Labor Events    Rupture date/time: 2024 1038  Rupture type: Artificial  Fluid color: Clear  Fluid odor: None  Labor type: Induced Onset of Labor  Labor allowed to proceed with plans for an attempted vaginal birth?: Yes  Induction: AROM, Oxytocin  First cervical ripening date/time: 2024 1038  Induction date/time: 2024  Induction indications: Other  Complications: None       Labor Event Times    Labor onset date/time: 2024  Dilation complete date/time: 2024  Start pushing date/time: 2024       Placenta    Placenta delivery date/time: 2024  Placenta removal: Spontaneous  Placenta appearance: Intact  Placenta disposition: pathology       Cord    Vessels: 3 vessels  Complications: None  Delayed cord clamping?: Yes  Cord clamped date/time: 2024  Cord blood disposition: Lab  Gases sent?: No  Stem cell collection (by provider): No       Lacerations    Perineal laceration: 2nd  Repair suture: 3-0 Synthetic Suture       Anesthesia    Method: Epidural       Operative Delivery    Forceps attempted?: No  Vacuum extractor attempted?: No       Shoulder Dystocia    Shoulder dystocia present?: No       [place] Delivery    Time head delivered: 2024 21:36:00  Birth date/time: 2024 21:36:00  Delivery type: Vaginal, Spontaneous  Complications: None       Resuscitation    Method: Tactile stimulation, Suctioning       Apgars    Living status: Living  Apgar Component Scores:  1 min.:  5 min.:  10 min.:  15 min.:  20 min.:    Skin color:  0  1       Heart rate:  2  2       Reflex irritability:  2  2       Muscle tone:  2  2       Respiratory effort:  2  2       Total:  8  9       Apgars  assigned by: BOYD SALAS RN       Delivery Providers    Delivering clinician: Tati Morrison MD   Provider Role    Roselia Kendrick, RN Delivery Nurse    David Salas RN Nursery Nurse     Resident               Slight uterine atony after delivery of the placenta. Improved with methergine and bimanual massage. Otherwise, uncomplicated .    Tati Morrison MD

## 2024-02-25 NOTE — DISCHARGE INSTRUCTIONS

## 2024-02-25 NOTE — CARE PLAN
Problem: Antepartum  Goal: Maintain pregnancy as long as maternal and/or fetal condition is stable  Outcome: Met  Goal: Avoid/minimize constipation  Outcome: Met  Goal: No decrease in circulation/VTE  Outcome: Met  Goal: FHR remains reassuring  Outcome: Met  Goal: Minimize anxiety/maximize coping  Outcome: Met     Problem: Vaginal Birth or  Section  Goal: Fetal and maternal status remain reassuring during the birth process  Outcome: Met  Goal: Tolerate CRB for IOL placement maintenance until dislodgement/removal 12hrs after placement  Outcome: Met  Goal: Prevention of malpresentation/labor dystocia through delivery  Outcome: Met  Goal: Demonstrates labor coping techniques through delivery  Outcome: Met  Goal: Minimal s/sx of HDP and BP<160/110  Outcome: Met  Goal: No s/sx of infection through recovery  Outcome: Met  Goal: No s/sx of hemorrhage through recovery  Outcome: Met     Problem: Postpartum  Goal: Experiences normal postpartum course  Outcome: Progressing  Goal: Appropriate maternal -  bonding  Outcome: Met  Goal: Establish and maintain infant feeding pattern for adequate nutrition  Outcome: Progressing  Goal: Incisions, wounds, or drain sites healing without S/S of infection  Outcome: Progressing  Goal: No s/sx infection  Outcome: Progressing  Goal: No s/sx of hemorrhage  Outcome: Progressing  Goal: Minimal s/sx of HDP and BP<160/110  Outcome: Progressing     Problem: Hypertensive Disorder of Pregnancy (HDP)  Goal: Minimal s/sx of HDP and BP<160/110  Outcome: Met  Goal: Adequate urine output (0.5 ml/kg/hr)  Outcome: Met     Problem: UH VAGINAL BLEEDING/HEMORRHAGE AP  Goal: Fewer then 4-6 ct per hour  Outcome: Met  Goal: No s/sx of hemorrhage  Outcome: Met     Problem: Pain - Adult  Goal: Verbalizes/displays adequate comfort level or baseline comfort level  Outcome: Met     Problem: Safety - Adult  Goal: Free from fall injury  Outcome: Met   The patient's goals for the shift include      The  clinical goals for the shift include Reassuring FHR, adequate BPs, safe delivery, and safe recovery.

## 2024-02-25 NOTE — PROGRESS NOTES
Postpartum Progress Note    Assessment/Plan   Emilia Link is a 28 y.o., , who delivered at 38w4d gestation and is now postpartum day 1.    Now PPD#1 s/p    - continue routine postpartum care  - pain well controlled on po medications  - DVT Score: 3, for ppx scds  - Hgb:   Results from last 7 days   Lab Units 24  0043 24  0916   HEMOGLOBIN g/dL 11.9* 12.4       Maternal Well-Being  - emotional support provided  - bonding with infant    [place] Feeding  - breastfeeding/pumping encouraged; lactation consult prn    Contraception  - POPs    Dispo  - anticipate d/c on PPD #2 if meeting all postpartum milestones  - for f/u 4-6 weeks with Primary OB provider      Principal Problem:    Fetal growth restriction antepartum    Pregnancy Problems (from 23 to present)       Problem Noted Resolved    Fetal growth restriction antepartum 12/15/2023 by Katy Morejon MD No    Priority:  Medium      Overview Addendum 2024 10:24 AM by Tati Morrison MD     12/15: growth with EFW 1080g 12%, AC<1%  1/3/24: EFW 18%tile, AC 2%tile, normal dopplers  24: EFW 1981g 9%tile, AC <1%tile, normal dopplers  24: EFW 2492g 8%Tile, AC <1%tile, normal dopplers  Continue weekly BPPs and dopplers   Serial growth US          Threatened  labor, antepartum 2023 by Adrien Lucero MD No    Priority:  Medium      Overview Signed 12/15/2023 12:03 PM by Katy Morejon MD     : right sided abdominal pain, cervix 1/80/-3 -> 1/50/-3   S/p BMZ -12/15         Leiomyoma of uterus affecting pregnancy in second trimester 2023 by Tati Morrison MD No    Priority:  Medium      Overview Addendum 2024 12:23 PM by Tati Morrison MD     Large uterine fibroids, largest [id number]  Stable size  Plan for serial growth this pregnancy         Prenatal care, subsequent pregnancy in second trimester 10/23/2023 by ASAF Patel-CNM, APRN-CNP No    Priority:  Medium       Overview Addendum 2/12/2024 10:01 AM by Tati Morrison MD     [x] Dating: LMP consistent with 9 week ultrasound  [x] Initial BMI: 19  [x] Prenatal Labs: O+, rubella immune, Hgb 11.5 (second trimester 11.0)  [x] Pap: NILM/neg HPV 7/2023  [x] Aneuploidy Screening:  initial cfDNA result inconclusive, repeat was risk reducing, microarray was normal  [x] Baby ASA: No  [x] Anatomy US: Normal fetal anatomy, large fibroids   [x] 1hr GCT at 24-28wks: normal at 111  [x] Tdap (27-36wks): given 12/18/23  [x] Flu Shot: Discussed, declines  [x] COVID vaccine: Discussed, declines  [x] GBS at 36 wks: collected 2/5/24 - negative  [x] Breastfeeding: Yes, planning to try  [x] PPBC: Reviewed options, desires POP  [x] 39 weeks discussion of IOL vs. Expectant management: Discussed with MFM and recommend delivery between 38-39 weeks gestation for FGR with EFW in 9%tile and AC 1%tile. Scheduled for IOL on 2/24, she will be 38+4.   [x] Mode of delivery: vaginal            Sickle cell trait in mother affecting pregnancy (CMS/HCC) 10/23/2023 by Estella Oliva, APRN-CNM, APRN-CNP No    Priority:  Medium      Overview Addendum 2/5/2024  1:10 PM by Tati Morrison MD     Partner has not been tested.   Genetic testing showed low risk of baby having sickle cell disease               Hospital course: no complications  Vaginal Birth  Patient is currently breastfeedingThe patient's blood type is O POS. The baby's blood type is B POS . Rhogam is not indicated.    Subjective   Her pain is well controlled with current medications  She is passing flatus  She is ambulating well  She is tolerating a Adult diet Regular  She reports no breast or nursing problems  She denies emotional concerns today   Her plan for contraception is oral contraceptives     Patient states some pain with urination around perineum towards the end of tylenol dosing but states witch hazel pads and dermaplast help. Will try using water squirt bottle as well. States light  lochia. Denies dizziness/lightheadedness/LOC/SOB/uncontrolled bleeding.     Objective   Allergies:   Motrin [ibuprofen]         Last Vitals:  Temp Pulse Resp BP MAP Pulse Ox   37 °C (98.6 °F) 69 16 101/64   98 %     Vitals Min/Max Last 24 Hours:  Temp  Min: 36.3 °C (97.3 °F)  Max: 37.2 °C (99 °F)  Pulse  Min: 57  Max: 117  Resp  Min: 14  Max: 18  BP  Min: 74/52  Max: 122/64    Intake/Output:     Intake/Output Summary (Last 24 hours) at 2/25/2024 1400  Last data filed at 2/25/2024 0435  Gross per 24 hour   Intake 500 ml   Output 2304 ml   Net -1804 ml       Physical Exam:  General: well appearing, well nourished, postpartum  Obstetric: fundus firm below umbilicus, lochia light  Skin: Warm, dry; no rashes/lesions/erythema  Breast: No masses, nipple discharge  Neuro: A/Ox3, conversational, no gross motor deficit   GI: no distension, appropriately tender, soft  Respiratory: Even and unlabored on RA  Cardiovascular: Trace BLE edema; No erythema, warmth  Psych: appropriate mood and affect     Lab Data:  Labs in chart were reviewed.

## 2024-02-25 NOTE — PROGRESS NOTES
Intrapartum Progress Note    Assessment/Plan   Emilia Link is a 28 y.o.  at 38w4d admitted for IOL for FGR    IOL  - active labor, anticipate vaginal delivery  - Continue pitocin per protocol  - s/p aROM at 1038   - Epidural infusing  - Plans to breastfeed  - ppBC: POPs      Maternal wellbeing  - Right lateral wall fibroid 10 x 8 x 6cm, T&C 1U pRBC      Fetal wellbeing  - FGR: EFW 2492g 8%, AC <1% on , dopplers wnl   - Cat I, CEFM   - GBS neg, no ppx indicated     Discussed with Dr. Prince Danielson MD, PGY-1      Subjective   Feeling increased rectal pressure with contractions    Objective   Last Vitals:  Temp Pulse Resp BP MAP Pulse Ox   36.4 °C (97.5 °F) 72 14 107/61   100 %     Vitals Min/Max Last 24 Hours:  Temp  Min: 36.3 °C (97.3 °F)  Max: 36.5 °C (97.7 °F)  Pulse  Min: 57  Max: 106  Resp  Min: 14  Max: 16  BP  Min: 74/52  Max: 121/73    Intake/Output:    Intake/Output Summary (Last 24 hours) at 2024  Last data filed at 2024 1800  Gross per 24 hour   Intake 500 ml   Output 750 ml   Net -250 ml       Physical Examination:  General: Lying in bed in NAD  Skin: No rashes/lesions/erythema  Neuro: Awake, alert, conversational  CV: Regular rate  Respiratory: Even and unlabored on RA  Extremities: Full ROM  Psych: appropriate mood and affect    Cervical Exam  Dilation: Lip/rim (Comment)  Effacement (%): 100  Fetal Station: 0  Method: Manual  OB Examiner: MD Jagjit  Fetal Assessment  Movement: Present  Mode: External US  Baseline Fetal Heart Rate (bpm): 130 bpm  Baseline Classification: Normal  Variability: Moderate (Between 6 and 25 BPM)  Pattern: Accelerations  Pattern Observations: RN at bedside adjusting US  FHR Category: Category I  Multiple Births: No      Contraction Frequency: 1.5-4    Lab Review:  Lab Results   Component Value Date    WBC 6.7 2024    HGB 12.4 2024    HCT 36.4 2024     2024

## 2024-02-25 NOTE — ANESTHESIA POSTPROCEDURE EVALUATION
Patient: Emilia Link    Procedure Summary       Date: 24 Room / Location:     Anesthesia Start:  Anesthesia Stop:     Procedure: Labor Analgesia Diagnosis:     Scheduled Providers:  Responsible Provider: Matias Spann MD    Anesthesia Type: epidural ASA Status: 2            Anesthesia Type: epidural    Emilia Link is a 28 y.o., , who had a Vaginal, Spontaneous  delivery on 2024  at 38w4d and is now POD1.    She had Neuraxial Anesthesia without immediate complications noted.       Pain well controlled    Vitals:    24 0753   BP: 99/63   Pulse: 72   Resp: 16   Temp: 36.3 °C (97.3 °F)   SpO2: 96%       Neuraxial site assessed. No visible redness or swelling or drainage. Patient able to ambulate and move all extremities without difficulty. Able to void. No complaints of nausea/vomiting. Tolerating PO intake well. No s/sx of PDPH.     Anesthesia will sign off     Laura Salas MD      No notable events documented.

## 2024-02-26 ENCOUNTER — APPOINTMENT (OUTPATIENT)
Dept: OBSTETRICS AND GYNECOLOGY | Facility: CLINIC | Age: 29
End: 2024-02-26
Payer: MEDICAID

## 2024-02-26 VITALS
TEMPERATURE: 97.9 F | HEIGHT: 61 IN | DIASTOLIC BLOOD PRESSURE: 63 MMHG | BODY MASS INDEX: 22.56 KG/M2 | WEIGHT: 119.49 LBS | HEART RATE: 66 BPM | OXYGEN SATURATION: 95 % | RESPIRATION RATE: 16 BRPM | SYSTOLIC BLOOD PRESSURE: 100 MMHG

## 2024-02-26 PROBLEM — O36.5990 FETAL GROWTH RESTRICTION ANTEPARTUM (HHS-HCC): Status: RESOLVED | Noted: 2023-12-15 | Resolved: 2024-02-26

## 2024-02-26 PROBLEM — Z34.82 PRENATAL CARE, SUBSEQUENT PREGNANCY IN SECOND TRIMESTER (HHS-HCC): Status: RESOLVED | Noted: 2023-10-23 | Resolved: 2024-02-26

## 2024-02-26 PROBLEM — O47.00 THREATENED PRETERM LABOR, ANTEPARTUM (HHS-HCC): Status: RESOLVED | Noted: 2023-12-14 | Resolved: 2024-02-26

## 2024-02-26 PROCEDURE — 2500000001 HC RX 250 WO HCPCS SELF ADMINISTERED DRUGS (ALT 637 FOR MEDICARE OP)

## 2024-02-26 PROCEDURE — 99238 HOSP IP/OBS DSCHRG MGMT 30/<: CPT

## 2024-02-26 RX ORDER — DOCUSATE SODIUM 100 MG/1
100 CAPSULE, LIQUID FILLED ORAL 2 TIMES DAILY PRN
Qty: 60 CAPSULE | Refills: 0 | Status: SHIPPED | OUTPATIENT
Start: 2024-02-26 | End: 2024-03-30

## 2024-02-26 RX ORDER — NORETHINDRONE 0.35 MG/1
1 TABLET ORAL DAILY
Qty: 28 TABLET | Refills: 1 | Status: SHIPPED | OUTPATIENT
Start: 2024-02-26 | End: 2024-04-22

## 2024-02-26 RX ORDER — ACETAMINOPHEN 500 MG
1000 TABLET ORAL EVERY 6 HOURS PRN
Qty: 120 TABLET | Refills: 0 | Status: SHIPPED | OUTPATIENT
Start: 2024-02-26

## 2024-02-26 RX ADMIN — CYCLOBENZAPRINE 5 MG: 10 TABLET, FILM COATED ORAL at 02:36

## 2024-02-26 RX ADMIN — ACETAMINOPHEN 975 MG: 325 TABLET ORAL at 00:03

## 2024-02-26 RX ADMIN — ACETAMINOPHEN 975 MG: 325 TABLET ORAL at 06:08

## 2024-02-26 RX ADMIN — ACETAMINOPHEN 975 MG: 325 TABLET ORAL at 12:05

## 2024-02-26 ASSESSMENT — PAIN DESCRIPTION - DESCRIPTORS: DESCRIPTORS: CRAMPING

## 2024-02-26 ASSESSMENT — PAIN SCALES - GENERAL
PAINLEVEL_OUTOF10: 2
PAINLEVEL_OUTOF10: 4

## 2024-02-26 NOTE — PROGRESS NOTES
Social Work Brief Note     Patient: Emilia Link   Name: Anca Medrano    : 24      Reason for Visit: Support       Assessment: SW met with mother of baby at bedside to introduce self, and SW role.  Ms. Link was friendly, receptive, and easy to engage in conversation.  She currently lives at [address] [place] [address]. [place] [address] with her maternal grandmother.  She denies safety concerns.  Mother of baby reports having all supplies needed to care for  including car seat, and safe sleep location.  She verbalized understanding of the ABC's of safe sleep.      She's employed as a Anesthetist.  She plans to apply for SNAP and WIC benefits during maternity leave.  Ms. Link denies any mental health history.  She's aware of signs/symptoms of PPD.  Stable mood reported on this date.  She appears to be bonding well with .  FOB is not involved at this time.     SW informed mother of a baby a referral would be completed to DCFS on this date due to positive UDS on 23 (fentanyl + opiates), 12/15/23 (repeat screen-fentanyl), 23 (repeat screen-fentanyl), and 24 (fentanyl).  UDS negative on 24.  UDS positive on 24 (epidural).  She continues to deny drug use of any type.    Plan: Ms. Margarito García MRN:[id number], and  jaylin March MRN:[id number] are clear from SW perspective.  [facility]  Kay Medrano [phone] has agreed to complete an emergency visit to patient's home this evening.  DCFS will continue to follow in the community.      [facility] Intake#[id number]    Signature: Elissa DUENAS \[facility]\""

## 2024-02-26 NOTE — DISCHARGE SUMMARY
Discharge Summary    Admission Date: 2024  Discharge Date: 2024     Discharge Diagnosis  Vaginal delivery    Hospital Course  Delivery Date: 2024  9:36 PM   Delivery type: Vaginal, Spontaneous    GA at delivery: 38w4d  Outcome: Living   Anesthesia during delivery: Epidural   Intrapartum complications: None   Feeding method: Breastfeeding Status: Yes       Procedures:    Contraception at discharge: oral contraceptives    Postpartum Course:  - Pain controlled on PO meds  - Hgb: 11.9    Patient seen on day of DC. Continuing to meet all PP milestones. All questions/concerns addressed. She is medically stable and feels comfortable going home today w/ follow up as below.    JULIO Alicea/Angeline    Pertinent Physical Exam At Time of Discharge  General: Examination reveals a well developed, well nourished, female, in no acute distress. She is alert and cooperative.  Abdomen: soft, non-distended, non-tender to palpation.  Fundus: firm, below umbilicus, and nontender.  Psychological: awake and alert; oriented to person, place, and time.  Skin: no rashes or lesions     Discharge Meds     Your medication list        START taking these medications        Instructions Last Dose Given Next Dose Due   acetaminophen 500 mg tablet  Commonly known as: Tylenol      Take 2 tablets (1,000 mg) by mouth every 6 hours if needed for moderate pain (4 - 6).       docusate sodium 100 mg capsule  Commonly known as: Colace      Take 1 capsule (100 mg) by mouth 2 times a day as needed for constipation.       norethindrone 0.35 mg tablet  Commonly known as: Micronor      Take 1 tablet (0.35 mg) over 28 days by mouth once daily.              CONTINUE taking these medications        Instructions Last Dose Given Next Dose Due   PRENATAL 19 ORAL           prenatal vitamin (iron-folic) 27 mg iron-800 mcg folic acid tablet      Take 1 tablet by mouth once daily.              STOP taking these medications      aspirin 81  mg EC tablet                  Where to Get Your Medications        These medications were sent to [facility]  [address] [address]      Hours: 8:30 AM to 5 PM Mon-Fri Phone: [phone]   acetaminophen 500 mg tablet  docusate sodium 100 mg capsule  norethindrone 0.35 mg tablet          Complications Requiring Follow-Up  Follow up in 4-6 wk for postpartum visit with your OB provider.     Test Results Pending At Discharge  Pending Labs       Order Current Status    Surgical Pathology Exam - [facility]AC[facility] In process            Outpatient Follow-Up  Future Appointments   Date Time Provider Department Center   3/4/2024 10:30 AM Tati Morrison MD [id number] [facility] spent 20 minutes in the professional and overall care of this patient.      Aleja Barboza PA-C

## 2024-02-26 NOTE — CARE PLAN
The patient's goals for the shift include      The clinical goals for the shift include Maintain WDL vital signs    Patient's vss, assessment stable, pain well controlled, lochia scant. Patient clear for discharge.

## 2024-02-26 NOTE — PROGRESS NOTES
Ms. Link tested positive for fentanyl and opiates on 12/14/23.  She was also positive with repeat UDS on 12/15/23, 12/18/23, and 1/5/24.  UDS negative on 2/5/24.  UDS positive on 2/25/24 due to Epidural.  Mother of baby denies drug use of any type.  She stated she's unsure how she was exposed to fentanyl.  Ms. Link verbalized understanding that a referral needed to be completed to DCFS due to hospital policy.      Intake ID#[id number]

## 2024-02-27 LAB
BLOOD EXPIRATION DATE: NORMAL
DISPENSE STATUS: NORMAL
PRODUCT BLOOD TYPE: 5100
PRODUCT CODE: NORMAL
UNIT ABO: NORMAL
UNIT NUMBER: NORMAL
UNIT RH: NORMAL
UNIT VOLUME: 350
XM INTEP: NORMAL

## 2024-02-28 LAB
LABORATORY COMMENT REPORT: NORMAL
PATH REPORT.FINAL DX SPEC: NORMAL
PATH REPORT.GROSS SPEC: NORMAL
PATH REPORT.RELEVANT HX SPEC: NORMAL
PATH REPORT.TOTAL CANCER: NORMAL

## 2024-02-29 ENCOUNTER — PHARMACY VISIT (OUTPATIENT)
Dept: PHARMACY | Facility: CLINIC | Age: 29
End: 2024-02-29
Payer: MEDICAID

## 2024-02-29 PROCEDURE — RXMED WILLOW AMBULATORY MEDICATION CHARGE

## 2024-03-04 ENCOUNTER — APPOINTMENT (OUTPATIENT)
Dept: OBSTETRICS AND GYNECOLOGY | Facility: CLINIC | Age: 29
End: 2024-03-04
Payer: MEDICAID

## 2024-04-08 ENCOUNTER — POSTPARTUM VISIT (OUTPATIENT)
Dept: OBSTETRICS AND GYNECOLOGY | Facility: CLINIC | Age: 29
End: 2024-04-08
Payer: MEDICAID

## 2024-04-08 VITALS
HEIGHT: 61 IN | WEIGHT: 102 LBS | BODY MASS INDEX: 19.26 KG/M2 | DIASTOLIC BLOOD PRESSURE: 60 MMHG | SYSTOLIC BLOOD PRESSURE: 116 MMHG

## 2024-04-08 PROBLEM — O99.019 SICKLE CELL TRAIT IN MOTHER AFFECTING PREGNANCY (MULTI): Status: RESOLVED | Noted: 2023-10-23 | Resolved: 2024-04-08

## 2024-04-08 PROBLEM — O34.12 LEIOMYOMA OF UTERUS AFFECTING PREGNANCY IN SECOND TRIMESTER (HHS-HCC): Status: RESOLVED | Noted: 2023-11-20 | Resolved: 2024-04-08

## 2024-04-08 PROBLEM — D25.9 LEIOMYOMA OF UTERUS AFFECTING PREGNANCY IN SECOND TRIMESTER (HHS-HCC): Status: RESOLVED | Noted: 2023-11-20 | Resolved: 2024-04-08

## 2024-04-08 PROBLEM — D57.3 SICKLE CELL TRAIT IN MOTHER AFFECTING PREGNANCY (MULTI): Status: RESOLVED | Noted: 2023-10-23 | Resolved: 2024-04-08

## 2024-04-08 ASSESSMENT — EDINBURGH POSTNATAL DEPRESSION SCALE (EPDS)
I HAVE BEEN SO UNHAPPY THAT I HAVE HAD DIFFICULTY SLEEPING: NOT AT ALL
I HAVE LOOKED FORWARD WITH ENJOYMENT TO THINGS: AS MUCH AS I EVER DID
I HAVE BEEN ANXIOUS OR WORRIED FOR NO GOOD REASON: HARDLY EVER
I HAVE BEEN ABLE TO LAUGH AND SEE THE FUNNY SIDE OF THINGS: AS MUCH AS I ALWAYS COULD
I HAVE BLAMED MYSELF UNNECESSARILY WHEN THINGS WENT WRONG: NOT VERY OFTEN
TOTAL SCORE: 3
THE THOUGHT OF HARMING MYSELF HAS OCCURRED TO ME: NEVER
THINGS HAVE BEEN GETTING ON TOP OF ME: NO, MOST OF THE TIME I HAVE COPED QUITE WELL
I HAVE FELT SAD OR MISERABLE: NO, NOT AT ALL
I HAVE FELT SCARED OR PANICKY FOR NO GOOD REASON: NO, NOT AT ALL
I HAVE BEEN SO UNHAPPY THAT I HAVE BEEN CRYING: NO, NEVER

## 2024-04-08 NOTE — PROGRESS NOTES
"Assessment     IMPRESSIONS:  1. Encounter for postpartum visit  - recovering well  - continue POPs for birth control  - pap up to date  - referred to breastfeeding medicine    Follow up for preventative visit in 1 year or sooner CLAUDIO Morrison MD    Subjective   28 y.o.  presenting for postpartum follow-up     Delivery Date: 24  GA at Delivery: 38w4d  Type of Delivery:     Pregnancy/delivery notable for: IOL at 38 weeks for FGR    Concerns: Thinking about starting a galactogogue. Pumping 3x daily. Occasionally direct feeds    Pain: controlled  Lacerations: healing well per patient, no concerns  Lochia: none  Menses: not yet  Sexual Intimacy: not yet  Contraceptive Method: Taking POP  Feeding Method: Exclusive breast milk with direct feeding and pumping  Lactation Consult Needed?: yes, referred to breastfeeding medicine  Bonding with Baby: well   Mood:   Denies concerns  Pap: 2023: NILM/neg HPV    Objective   Vitals:    24 0846   BP: 116/60       PHYSICAL EXAM  Objective   /60   Ht 1.549 m (5' 1\")   Wt 46.3 kg (102 lb)   LMP 2023   Breastfeeding Yes   BMI 19.27 kg/m²      General:   Alert and oriented, in no acute distress           Abdomen: Soft, non-tender, without masses or organomegaly   Vulva: Normal architecture without erythema, masses, or lesions.    Vagina: Normal mucosa without lesions, masses, or atrophy. No abnormal vaginal discharge. Laceration site well healed. Still slightly tender   Cervix: deferred   Uterus:    Adnexa:    Pelvic Floor    Psych Normal affect. Normal mood.        "

## 2024-07-15 ENCOUNTER — OFFICE VISIT (OUTPATIENT)
Dept: OBSTETRICS AND GYNECOLOGY | Facility: CLINIC | Age: 29
End: 2024-07-15
Payer: MEDICAID

## 2024-07-15 VITALS
DIASTOLIC BLOOD PRESSURE: 61 MMHG | HEIGHT: 61 IN | WEIGHT: 99 LBS | SYSTOLIC BLOOD PRESSURE: 91 MMHG | BODY MASS INDEX: 18.69 KG/M2

## 2024-07-15 DIAGNOSIS — Z11.3 SCREEN FOR STD (SEXUALLY TRANSMITTED DISEASE): ICD-10-CM

## 2024-07-15 DIAGNOSIS — N76.0 ACUTE VAGINITIS: Primary | ICD-10-CM

## 2024-07-15 PROCEDURE — 87070 CULTURE OTHR SPECIMN AEROBIC: CPT

## 2024-07-15 PROCEDURE — 3008F BODY MASS INDEX DOCD: CPT | Performed by: OBSTETRICS & GYNECOLOGY

## 2024-07-15 PROCEDURE — 87491 CHLMYD TRACH DNA AMP PROBE: CPT

## 2024-07-15 PROCEDURE — 87591 N.GONORRHOEAE DNA AMP PROB: CPT

## 2024-07-15 PROCEDURE — 99204 OFFICE O/P NEW MOD 45 MIN: CPT | Performed by: OBSTETRICS & GYNECOLOGY

## 2024-07-15 PROCEDURE — 1036F TOBACCO NON-USER: CPT | Performed by: OBSTETRICS & GYNECOLOGY

## 2024-07-15 RX ORDER — FLUCONAZOLE 150 MG/1
150 TABLET ORAL ONCE
Qty: 2 TABLET | Refills: 0 | Status: SHIPPED | OUTPATIENT
Start: 2024-07-15 | End: 2024-07-15

## 2024-07-15 ASSESSMENT — PAIN SCALES - GENERAL: PAINLEVEL: 0-NO PAIN

## 2024-07-16 ENCOUNTER — APPOINTMENT (OUTPATIENT)
Dept: OBSTETRICS AND GYNECOLOGY | Facility: CLINIC | Age: 29
End: 2024-07-16
Payer: MEDICAID

## 2024-07-16 LAB
C TRACH RRNA SPEC QL NAA+PROBE: NEGATIVE
N GONORRHOEA DNA SPEC QL PROBE+SIG AMP: NEGATIVE

## 2024-07-17 ASSESSMENT — ENCOUNTER SYMPTOMS
CARDIOVASCULAR NEGATIVE: 1
RESPIRATORY NEGATIVE: 1
ENDOCRINE NEGATIVE: 1
HEMATOLOGIC/LYMPHATIC NEGATIVE: 1
ALLERGIC/IMMUNOLOGIC NEGATIVE: 1
GASTROINTESTINAL NEGATIVE: 1
NEUROLOGICAL NEGATIVE: 1
CONSTITUTIONAL NEGATIVE: 1
PSYCHIATRIC NEGATIVE: 1
EYES NEGATIVE: 1
MUSCULOSKELETAL NEGATIVE: 1

## 2024-07-18 NOTE — PROGRESS NOTES
Subjective   Patient ID: Emilia Link is a 29 y.o. female who presents for Vaginal Irritation/Discharge (New Patient is here for vaginal irritation and discharge. /Last pap:  2023  neg/neg/Accepts chaperone.   Venita Loving LPN).  HPI patient is here to establish care patient is 29-year-old female  2 para 1 status post spontaneous vaginal delivery 4 months ago patient is bottlefeeding she complains of vaginal irritation with reminders reminds her of previous yeast infection last menstrual period 6/15/2024 menarche at 9.  Every 28 days lasting for 7 days no pain patient uses Micronor for contraception needs new oral contraceptives last Pap test  normal patient does not smoke she drinks alcohol socially she does not use drugs she has no medications she is allergic to Motrin past medical history significant for sickle cell trait surgical history negative family history positive for carcinoma: Hypertension    Review of Systems   Constitutional: Negative.    Eyes: Negative.    Respiratory: Negative.     Cardiovascular: Negative.    Gastrointestinal: Negative.    Endocrine: Negative.    Genitourinary: Negative.    Musculoskeletal: Negative.    Skin: Negative.    Allergic/Immunologic: Negative.    Neurological: Negative.    Hematological: Negative.    Psychiatric/Behavioral: Negative.         Objective   Physical Exam  Constitutional:       Appearance: Normal appearance.   HENT:      Head: Normocephalic and atraumatic.   Cardiovascular:      Rate and Rhythm: Normal rate and regular rhythm.      Pulses: Normal pulses.      Heart sounds: Normal heart sounds.   Pulmonary:      Effort: Pulmonary effort is normal.      Breath sounds: Normal breath sounds.   Abdominal:      General: Abdomen is flat. Bowel sounds are normal.      Palpations: Abdomen is soft.      Hernia: There is no hernia in the left inguinal area or right inguinal area.   Genitourinary:     General: Normal vulva.      Exam  position: Lithotomy position.      Labia:         Right: No rash, tenderness or lesion.         Left: No rash, tenderness or lesion.       Urethra: No prolapse.      Vagina: Vaginal discharge and erythema present.      Cervix: Normal.      Uterus: Normal.       Adnexa: Right adnexa normal and left adnexa normal.   Musculoskeletal:      Cervical back: Normal range of motion and neck supple.   Skin:     General: Skin is warm and dry.   Neurological:      General: No focal deficit present.      Mental Status: She is alert and oriented to person, place, and time.         Assessment/Plan    vaginitis likely yeast vaginitis  Genital culture gonorrhea chlamydia test prescription for fluconazole 150 mg 2 tablets with refills         Amado Kaur MD 07/17/24 10:28 PM

## 2024-07-19 LAB — BACTERIA GENITAL AEROBE CULT: ABNORMAL

## 2024-10-02 ENCOUNTER — OFFICE VISIT (OUTPATIENT)
Dept: OBSTETRICS AND GYNECOLOGY | Facility: CLINIC | Age: 29
End: 2024-10-02
Payer: MEDICAID

## 2024-10-02 VITALS
BODY MASS INDEX: 19.26 KG/M2 | SYSTOLIC BLOOD PRESSURE: 90 MMHG | DIASTOLIC BLOOD PRESSURE: 50 MMHG | WEIGHT: 102 LBS | HEIGHT: 61 IN

## 2024-10-02 DIAGNOSIS — R10.2 PELVIC PAIN IN FEMALE: ICD-10-CM

## 2024-10-02 LAB
POC APPEARANCE, URINE: CLEAR
POC BILIRUBIN, URINE: NEGATIVE
POC BLOOD, URINE: NEGATIVE
POC COLOR, URINE: YELLOW
POC GLUCOSE, URINE: NEGATIVE MG/DL
POC KETONES, URINE: NEGATIVE MG/DL
POC LEUKOCYTES, URINE: NEGATIVE
POC NITRITE,URINE: NEGATIVE
POC PH, URINE: 5 PH
POC PROTEIN, URINE: NEGATIVE MG/DL
POC SPECIFIC GRAVITY, URINE: 1.02
POC UROBILINOGEN, URINE: 0.2 EU/DL

## 2024-10-02 PROCEDURE — 3008F BODY MASS INDEX DOCD: CPT | Performed by: ADVANCED PRACTICE MIDWIFE

## 2024-10-02 PROCEDURE — 81003 URINALYSIS AUTO W/O SCOPE: CPT | Performed by: ADVANCED PRACTICE MIDWIFE

## 2024-10-02 PROCEDURE — 1036F TOBACCO NON-USER: CPT | Performed by: ADVANCED PRACTICE MIDWIFE

## 2024-10-02 PROCEDURE — 99212 OFFICE O/P EST SF 10 MIN: CPT | Performed by: ADVANCED PRACTICE MIDWIFE

## 2024-10-02 ASSESSMENT — PAIN SCALES - GENERAL: PAINLEVEL: 0-NO PAIN

## 2024-10-02 NOTE — PROGRESS NOTES
Subjective   Patient ID: Emilia Link is a 29 y.o. female who presents for Pelvic Pain (R side).  HPI    Pt. Reports having a strong cramp after urinating after drinking janes beer but since has resolved   Was worried about a UTI     No dysuria    Eats a garlic clove for pain, which has helped    Monogamous male partner x 3 years  No abnormal discharge    Just stopped breastfeeding about 3 months  Menstrual cycles have returned regular     Occasional condom use, actively avoiding pregnancy      Review of Systems    Objective   Physical Exam  Constitutional:       Appearance: Normal appearance. She is normal weight.   Abdominal:      General: Abdomen is flat.      Palpations: Abdomen is soft.      Tenderness: There is no abdominal tenderness.   Neurological:      Mental Status: She is alert.   Psychiatric:         Mood and Affect: Mood normal.         Behavior: Behavior normal.         Thought Content: Thought content normal.         Judgment: Judgment normal.         Assessment/Plan   Problem List Items Addressed This Visit             ICD-10-CM       Medium    Pelvic pain in female R10.2     Resolved on exam today  RTO with further concerns          Relevant Orders    POCT UA Automated manually resulted (Completed)            EBONY Tucker 10/02/24 4:17 PM

## 2024-12-18 ENCOUNTER — APPOINTMENT (OUTPATIENT)
Dept: PRIMARY CARE | Facility: CLINIC | Age: 29
End: 2024-12-18
Payer: MEDICAID

## 2025-07-20 NOTE — PROGRESS NOTES
Last gyn:   Pap: 2023 unremarkable with co-testing  Mammogram:   Colonoscopy:   Contraception:     Assessment/plan:

## 2025-07-22 ENCOUNTER — APPOINTMENT (OUTPATIENT)
Dept: OBSTETRICS AND GYNECOLOGY | Facility: CLINIC | Age: 30
End: 2025-07-22
Payer: MEDICAID

## 2025-07-22 VITALS
HEIGHT: 61 IN | SYSTOLIC BLOOD PRESSURE: 116 MMHG | BODY MASS INDEX: 19.45 KG/M2 | WEIGHT: 103 LBS | DIASTOLIC BLOOD PRESSURE: 62 MMHG

## 2025-07-22 DIAGNOSIS — N89.8 VAGINAL IRRITATION: ICD-10-CM

## 2025-07-22 DIAGNOSIS — N89.8 VAGINAL DISCHARGE: Primary | ICD-10-CM

## 2025-07-22 PROCEDURE — 99214 OFFICE O/P EST MOD 30 MIN: CPT | Performed by: OBSTETRICS & GYNECOLOGY

## 2025-07-22 PROCEDURE — 3008F BODY MASS INDEX DOCD: CPT | Performed by: OBSTETRICS & GYNECOLOGY

## 2025-07-22 RX ORDER — TRETINOIN 0.5 MG/G
CREAM TOPICAL
COMMUNITY
Start: 2024-10-24

## 2025-07-22 ASSESSMENT — PAIN SCALES - GENERAL: PAINLEVEL_OUTOF10: 0-NO PAIN

## 2025-07-23 LAB — BV SCORE VAG QL: ABNORMAL

## 2025-07-24 ENCOUNTER — TELEPHONE (OUTPATIENT)
Dept: OBSTETRICS AND GYNECOLOGY | Facility: CLINIC | Age: 30
End: 2025-07-24

## 2025-07-24 ENCOUNTER — RESULTS FOLLOW-UP (OUTPATIENT)
Dept: OBSTETRICS AND GYNECOLOGY | Facility: CLINIC | Age: 30
End: 2025-07-24
Payer: MEDICAID

## 2025-07-24 DIAGNOSIS — B96.89 BACTERIAL VAGINOSIS: ICD-10-CM

## 2025-07-24 DIAGNOSIS — N76.0 BACTERIAL VAGINOSIS: ICD-10-CM

## 2025-07-24 NOTE — TELEPHONE ENCOUNTER
----- Message from Marine Barnard sent at 7/24/2025 11:09 AM EDT -----  Please call the patient regarding her abnormal result.  Offer treatment for BV, flagyl 500 BID X 7 days  ----- Message -----  From: [facility] Results In  Sent: 7/23/2025   1:25 PM EDT  To: Marine Barnard MD

## 2025-07-24 NOTE — TELEPHONE ENCOUNTER
Pt contacted.   Discussed +BV on swab result.    No yeast.  Dr santiago advised flagyl 500mg bid x 7d.  Rx to be sent to [facility].  Pt states she typically gets a yeast infection following antibiotic use.  Would provider also send in diflucan to have in case it it needed?

## 2025-07-25 DIAGNOSIS — B96.89 BACTERIAL VAGINOSIS: ICD-10-CM

## 2025-07-25 DIAGNOSIS — N76.0 BACTERIAL VAGINOSIS: ICD-10-CM

## 2025-07-26 RX ORDER — METRONIDAZOLE 500 MG/1
500 TABLET ORAL 2 TIMES DAILY
Qty: 14 TABLET | Refills: 0 | OUTPATIENT
Start: 2025-07-26 | End: 2025-08-02

## 2025-07-26 RX ORDER — FLUCONAZOLE 150 MG/1
TABLET ORAL
Qty: 1 TABLET | Refills: 0 | OUTPATIENT
Start: 2025-07-26

## 2025-07-26 RX ORDER — FLUCONAZOLE 150 MG/1
TABLET ORAL
Qty: 1 TABLET | Refills: 0 | Status: SHIPPED | OUTPATIENT
Start: 2025-07-26

## 2025-07-26 RX ORDER — METRONIDAZOLE 500 MG/1
500 TABLET ORAL 2 TIMES DAILY
Qty: 14 TABLET | Refills: 0 | Status: SHIPPED | OUTPATIENT
Start: 2025-07-26 | End: 2025-08-02

## 2025-08-08 DIAGNOSIS — Z30.011 ENCOUNTER FOR INITIAL PRESCRIPTION OF CONTRACEPTIVE PILLS: Primary | ICD-10-CM

## 2025-08-08 RX ORDER — NORGESTIMATE AND ETHINYL ESTRADIOL 0.25-0.035
1 KIT ORAL DAILY
Qty: 84 TABLET | Refills: 3 | Status: SHIPPED | OUTPATIENT
Start: 2025-08-08 | End: 2026-08-08

## 2025-08-08 NOTE — PROGRESS NOTES
Contacted patient and identity verified. Requesting Rx for contraceptive pill. Has used before successfully. LMP 7/10/25. [place] since period. Denies history of HTN, DVT/VTE, migraine with aura. Instructed to wait to start pill until first day of her menstrual cycle to ensure not pregnant. Rx sent to pharmacy.     Ino Gong MD

## 2025-09-03 ENCOUNTER — APPOINTMENT (OUTPATIENT)
Dept: PRIMARY CARE | Facility: CLINIC | Age: 30
End: 2025-09-03
Payer: MEDICAID

## 2025-09-03 PROBLEM — D24.9 FIBROADENOMA: Status: ACTIVE | Noted: 2025-09-03

## 2025-09-03 PROBLEM — R92.8 ABNORMAL MAMMOGRAM OF RIGHT BREAST: Status: ACTIVE | Noted: 2025-09-03

## 2025-09-03 PROBLEM — R21 SKIN RASH: Status: ACTIVE | Noted: 2025-09-03

## 2025-09-03 PROBLEM — Z00.00 HEALTH CARE MAINTENANCE: Status: ACTIVE | Noted: 2025-09-03

## 2025-09-03 PROBLEM — Z13.1 DIABETES MELLITUS SCREENING: Status: ACTIVE | Noted: 2025-09-03

## 2025-09-03 PROBLEM — Z13.220 SCREENING CHOLESTEROL LEVEL: Status: ACTIVE | Noted: 2025-09-03

## 2025-09-03 PROBLEM — Z13.220 NEED FOR LIPID SCREENING: Status: ACTIVE | Noted: 2025-09-03

## 2025-09-03 ASSESSMENT — PATIENT HEALTH QUESTIONNAIRE - PHQ9
SUM OF ALL RESPONSES TO PHQ9 QUESTIONS 1 & 2: 0
2. FEELING DOWN, DEPRESSED OR HOPELESS: NOT AT ALL
1. LITTLE INTEREST OR PLEASURE IN DOING THINGS: NOT AT ALL